# Patient Record
Sex: MALE | Race: WHITE | NOT HISPANIC OR LATINO | Employment: OTHER | URBAN - METROPOLITAN AREA
[De-identification: names, ages, dates, MRNs, and addresses within clinical notes are randomized per-mention and may not be internally consistent; named-entity substitution may affect disease eponyms.]

---

## 2023-04-06 PROBLEM — K63.5 COLON POLYPS: Status: ACTIVE | Noted: 2023-04-06

## 2023-04-14 ENCOUNTER — OFFICE VISIT (OUTPATIENT)
Dept: PRIMARY CARE | Facility: CLINIC | Age: 68
End: 2023-04-14
Payer: MEDICARE

## 2023-04-14 ENCOUNTER — LAB (OUTPATIENT)
Dept: LAB | Facility: LAB | Age: 68
End: 2023-04-14
Payer: MEDICARE

## 2023-04-14 VITALS
HEART RATE: 67 BPM | TEMPERATURE: 97 F | WEIGHT: 245 LBS | SYSTOLIC BLOOD PRESSURE: 128 MMHG | OXYGEN SATURATION: 98 % | BODY MASS INDEX: 38.37 KG/M2 | DIASTOLIC BLOOD PRESSURE: 76 MMHG

## 2023-04-14 DIAGNOSIS — I48.91 ATRIAL FIBRILLATION STATUS POST CARDIOVERSION (MULTI): Primary | ICD-10-CM

## 2023-04-14 DIAGNOSIS — I10 BENIGN ESSENTIAL HTN: ICD-10-CM

## 2023-04-14 DIAGNOSIS — D72.829 LEUKOCYTOSIS, UNSPECIFIED TYPE: ICD-10-CM

## 2023-04-14 LAB
BASOPHILS (10*3/UL) IN BLOOD BY AUTOMATED COUNT: 0.03 X10E9/L (ref 0–0.1)
BASOPHILS/100 LEUKOCYTES IN BLOOD BY AUTOMATED COUNT: 0.2 % (ref 0–2)
EOSINOPHILS (10*3/UL) IN BLOOD BY AUTOMATED COUNT: 0.06 X10E9/L (ref 0–0.7)
EOSINOPHILS/100 LEUKOCYTES IN BLOOD BY AUTOMATED COUNT: 0.5 % (ref 0–6)
ERYTHROCYTE DISTRIBUTION WIDTH (RATIO) BY AUTOMATED COUNT: 16.6 % (ref 11.5–14.5)
ERYTHROCYTE MEAN CORPUSCULAR HEMOGLOBIN CONCENTRATION (G/DL) BY AUTOMATED: 31.9 G/DL (ref 32–36)
ERYTHROCYTE MEAN CORPUSCULAR VOLUME (FL) BY AUTOMATED COUNT: 89 FL (ref 80–100)
ERYTHROCYTES (10*6/UL) IN BLOOD BY AUTOMATED COUNT: 5.49 X10E12/L (ref 4.5–5.9)
HEMATOCRIT (%) IN BLOOD BY AUTOMATED COUNT: 48.9 % (ref 41–52)
HEMOGLOBIN (G/DL) IN BLOOD: 15.6 G/DL (ref 13.5–17.5)
IMMATURE GRANULOCYTES/100 LEUKOCYTES IN BLOOD BY AUTOMATED COUNT: 0.5 % (ref 0–0.9)
LEUKOCYTES (10*3/UL) IN BLOOD BY AUTOMATED COUNT: 12.4 X10E9/L (ref 4.4–11.3)
LYMPHOCYTES (10*3/UL) IN BLOOD BY AUTOMATED COUNT: 1.58 X10E9/L (ref 1.2–4.8)
LYMPHOCYTES/100 LEUKOCYTES IN BLOOD BY AUTOMATED COUNT: 12.7 % (ref 13–44)
MONOCYTES (10*3/UL) IN BLOOD BY AUTOMATED COUNT: 0.87 X10E9/L (ref 0.1–1)
MONOCYTES/100 LEUKOCYTES IN BLOOD BY AUTOMATED COUNT: 7 % (ref 2–10)
NEUTROPHILS (10*3/UL) IN BLOOD BY AUTOMATED COUNT: 9.8 X10E9/L (ref 1.2–7.7)
NEUTROPHILS/100 LEUKOCYTES IN BLOOD BY AUTOMATED COUNT: 79.1 % (ref 40–80)
PLATELETS (10*3/UL) IN BLOOD AUTOMATED COUNT: 214 X10E9/L (ref 150–450)

## 2023-04-14 PROCEDURE — 3074F SYST BP LT 130 MM HG: CPT | Performed by: PHYSICIAN ASSISTANT

## 2023-04-14 PROCEDURE — 3078F DIAST BP <80 MM HG: CPT | Performed by: PHYSICIAN ASSISTANT

## 2023-04-14 PROCEDURE — 99214 OFFICE O/P EST MOD 30 MIN: CPT | Performed by: PHYSICIAN ASSISTANT

## 2023-04-14 PROCEDURE — 85025 COMPLETE CBC W/AUTO DIFF WBC: CPT

## 2023-04-14 PROCEDURE — 1160F RVW MEDS BY RX/DR IN RCRD: CPT | Performed by: PHYSICIAN ASSISTANT

## 2023-04-14 PROCEDURE — 1159F MED LIST DOCD IN RCRD: CPT | Performed by: PHYSICIAN ASSISTANT

## 2023-04-14 PROCEDURE — 1036F TOBACCO NON-USER: CPT | Performed by: PHYSICIAN ASSISTANT

## 2023-04-14 PROCEDURE — 3008F BODY MASS INDEX DOCD: CPT | Performed by: PHYSICIAN ASSISTANT

## 2023-04-14 PROCEDURE — 36415 COLL VENOUS BLD VENIPUNCTURE: CPT

## 2023-04-14 RX ORDER — LOSARTAN POTASSIUM 50 MG/1
50 TABLET ORAL DAILY
COMMUNITY
End: 2024-02-21

## 2023-04-14 RX ORDER — ATORVASTATIN CALCIUM 80 MG/1
80 TABLET, FILM COATED ORAL NIGHTLY
COMMUNITY
End: 2024-03-27

## 2023-04-14 RX ORDER — ASPIRIN 81 MG/1
TABLET ORAL
COMMUNITY
Start: 2018-11-06 | End: 2023-12-19 | Stop reason: WASHOUT

## 2023-04-14 RX ORDER — METOPROLOL SUCCINATE 50 MG/1
50 TABLET, EXTENDED RELEASE ORAL DAILY
COMMUNITY
End: 2024-03-27 | Stop reason: SDUPTHER

## 2023-04-14 RX ORDER — METOPROLOL SUCCINATE 25 MG/1
50 TABLET, EXTENDED RELEASE ORAL DAILY
COMMUNITY
End: 2023-04-14 | Stop reason: WASHOUT

## 2023-04-14 RX ORDER — FLUTICASONE PROPIONATE 50 MCG
SPRAY, SUSPENSION (ML) NASAL
COMMUNITY
Start: 2018-02-27 | End: 2023-12-21 | Stop reason: WASHOUT

## 2023-04-14 RX ORDER — NICOTINE POLACRILEX 4 MG
1 GUM BUCCAL DAILY
COMMUNITY
Start: 2020-11-09 | End: 2023-04-14 | Stop reason: WASHOUT

## 2023-04-14 RX ORDER — ECHINACEA 400 MG
CAPSULE ORAL
COMMUNITY
Start: 2020-11-09

## 2023-04-14 RX ORDER — CLOPIDOGREL BISULFATE 75 MG/1
TABLET ORAL
COMMUNITY
End: 2024-03-27

## 2023-04-14 RX ORDER — CHOLECALCIFEROL (VITAMIN D3) 125 MCG
CAPSULE ORAL
COMMUNITY

## 2023-04-14 ASSESSMENT — ENCOUNTER SYMPTOMS
SHORTNESS OF BREATH: 0
ABDOMINAL PAIN: 0
PALPITATIONS: 0

## 2023-04-14 NOTE — PROGRESS NOTES
Subjective   Patient ID: Duane D Flowers is a 67 y.o. male who presents for Hospital Follow-up (Formerly [facility], [facility] on 4/1-4/4 Re: ablasion, afib).    HPI   Patient presents for hospital follow-up.  Went to ER on 4/1/2023 due to intermittent chest pressure and fatigue that had been going on for about 2 weeks.  Had mild lightheadedness and dizziness with mild SOB and diaphoresis.  Felt some palpitations.  Was found to be in A-fib with RVR.  Had negative stress test.  Had successful cardioversion done and converted back to NSR.     Discharged on Eliquis 5mg bid and increased Metoprolol ER 50mg every day.     BP has been good.  Feeling much better. Doing some jogging exercise without difficulty.     Has follow up with Dr Carter next week.     WBC was elevated while in the hospital. RBC slightly elevated. No evidence of infection.  Recommended repeat CBC outpatient and possible hematology consult.   WBC (x10E9/L)   Date Value   04/14/2023 12.4 (H)   04/04/2023 18.3 (H)   04/03/2023 17.9 (H)   04/02/2023 17.4 (H)   04/01/2023 21.1 (H)   06/07/2022 10.8     RBC (x10E12/L)   Date Value   04/14/2023 5.49   04/04/2023 6.13 (H)   04/03/2023 6.28 (H)   04/02/2023 5.76   04/01/2023 6.44 (H)   06/07/2022 5.51         Review of Systems   Respiratory:  Negative for shortness of breath.    Cardiovascular:  Negative for chest pain and palpitations.   Gastrointestinal:  Negative for abdominal pain.       Objective   /76   Pulse 67   Temp 36.1 °C (97 °F)   Wt 111 kg (245 lb)   SpO2 98%   BMI 38.37 kg/m²     Physical Exam  HENT:      Head: Normocephalic.   Eyes:      General: No scleral icterus.  Cardiovascular:      Rate and Rhythm: Normal rate. Rhythm irregular.      Comments: Rhythm is irregularly irregular with normal rate.   Pulmonary:      Effort: Pulmonary effort is normal.      Breath sounds: Normal breath sounds.   Skin:     General: Skin is warm and dry.   Neurological:      Mental Status: He is alert.   Psychiatric:          Mood and Affect: Affect normal.         Assessment/Plan   Diagnoses and all orders for this visit:  Atrial fibrillation status post cardioversion (CMS/HCC)  Benign essential HTN  Leukocytosis, unspecified type  -     CBC and Auto Differential; Future         Repeat CBC in near future. If still abnormal, consider hematology referral.   Monitor for rapid pulse (or other cardiac symptoms)-- go to ER if develops.   Advised to call his cardiologist to see if they want to move up his appt since he is back in A-fib but normal rate. Follow up with cardiologist.   He is well anti-coagulated.   Follow up as needed, otherwise keep routine follow up as scheduled.

## 2023-04-17 ENCOUNTER — TELEPHONE (OUTPATIENT)
Dept: PRIMARY CARE | Facility: CLINIC | Age: 68
End: 2023-04-17
Payer: COMMERCIAL

## 2023-04-17 NOTE — TELEPHONE ENCOUNTER
----- Message from Scotty Loyd PA-C sent at 4/17/2023  7:31 AM EDT -----  Inform pt that his repeat blood count was much improved. His red blood cell count was back to normal, and his white blood cell count improved a lot and is nearly back to normal.

## 2023-05-05 LAB
ANION GAP IN SER/PLAS: 11 MMOL/L (ref 10–20)
CALCIUM (MG/DL) IN SER/PLAS: 9.2 MG/DL (ref 8.6–10.3)
CARBON DIOXIDE, TOTAL (MMOL/L) IN SER/PLAS: 27 MMOL/L (ref 21–32)
CHLORIDE (MMOL/L) IN SER/PLAS: 108 MMOL/L (ref 98–107)
CREATININE (MG/DL) IN SER/PLAS: 0.86 MG/DL (ref 0.5–1.3)
ERYTHROCYTE DISTRIBUTION WIDTH (RATIO) BY AUTOMATED COUNT: 16.5 % (ref 11.5–14.5)
ERYTHROCYTE MEAN CORPUSCULAR HEMOGLOBIN CONCENTRATION (G/DL) BY AUTOMATED: 31.6 G/DL (ref 32–36)
ERYTHROCYTE MEAN CORPUSCULAR VOLUME (FL) BY AUTOMATED COUNT: 92 FL (ref 80–100)
ERYTHROCYTES (10*6/UL) IN BLOOD BY AUTOMATED COUNT: 5.17 X10E12/L (ref 4.5–5.9)
GFR MALE: >90 ML/MIN/1.73M2
GLUCOSE (MG/DL) IN SER/PLAS: 97 MG/DL (ref 74–99)
HEMATOCRIT (%) IN BLOOD BY AUTOMATED COUNT: 47.8 % (ref 41–52)
HEMOGLOBIN (G/DL) IN BLOOD: 15.1 G/DL (ref 13.5–17.5)
INR IN PPP BY COAGULATION ASSAY: 1.4 (ref 0.9–1.1)
LEUKOCYTES (10*3/UL) IN BLOOD BY AUTOMATED COUNT: 10.8 X10E9/L (ref 4.4–11.3)
PLATELETS (10*3/UL) IN BLOOD AUTOMATED COUNT: 203 X10E9/L (ref 150–450)
POTASSIUM (MMOL/L) IN SER/PLAS: 5.1 MMOL/L (ref 3.5–5.3)
PROTHROMBIN TIME (PT) IN PPP BY COAGULATION ASSAY: 15.8 SEC (ref 9.8–13.4)
SODIUM (MMOL/L) IN SER/PLAS: 141 MMOL/L (ref 136–145)
UREA NITROGEN (MG/DL) IN SER/PLAS: 20 MG/DL (ref 6–23)

## 2023-05-22 ENCOUNTER — HOSPITAL ENCOUNTER (OUTPATIENT)
Dept: DATA CONVERSION | Facility: HOSPITAL | Age: 68
End: 2023-05-22
Attending: INTERNAL MEDICINE | Admitting: INTERNAL MEDICINE
Payer: COMMERCIAL

## 2023-05-22 DIAGNOSIS — Z87.891 PERSONAL HISTORY OF NICOTINE DEPENDENCE: ICD-10-CM

## 2023-05-22 DIAGNOSIS — Z79.01 LONG TERM (CURRENT) USE OF ANTICOAGULANTS: ICD-10-CM

## 2023-05-22 DIAGNOSIS — I48.91 UNSPECIFIED ATRIAL FIBRILLATION (MULTI): ICD-10-CM

## 2023-05-22 DIAGNOSIS — Z79.82 LONG TERM (CURRENT) USE OF ASPIRIN: ICD-10-CM

## 2023-05-22 DIAGNOSIS — I25.10 ATHEROSCLEROTIC HEART DISEASE OF NATIVE CORONARY ARTERY WITHOUT ANGINA PECTORIS: ICD-10-CM

## 2023-05-22 DIAGNOSIS — E78.5 HYPERLIPIDEMIA, UNSPECIFIED: ICD-10-CM

## 2023-05-22 DIAGNOSIS — Z79.02 LONG TERM (CURRENT) USE OF ANTITHROMBOTICS/ANTIPLATELETS: ICD-10-CM

## 2023-05-22 DIAGNOSIS — I10 ESSENTIAL (PRIMARY) HYPERTENSION: ICD-10-CM

## 2023-05-22 DIAGNOSIS — Z95.5 PRESENCE OF CORONARY ANGIOPLASTY IMPLANT AND GRAFT: ICD-10-CM

## 2023-05-22 DIAGNOSIS — E66.9 OBESITY, UNSPECIFIED: ICD-10-CM

## 2023-05-30 LAB
POC ACTIVATED CLOTTING TIME HIGH RANGE: 324 SECONDS (ref 96–152)
POC ACTIVATED CLOTTING TIME HIGH RANGE: 331 SECONDS (ref 96–152)
POC ACTIVATED CLOTTING TIME HIGH RANGE: 332 SECONDS (ref 96–152)
POC ACTIVATED CLOTTING TIME HIGH RANGE: 343 SECONDS (ref 96–152)

## 2023-08-22 PROBLEM — I25.10 CAD (CORONARY ARTERY DISEASE): Status: ACTIVE | Noted: 2023-08-22

## 2023-08-22 PROBLEM — R06.02 SHORTNESS OF BREATH ON EXERTION: Status: ACTIVE | Noted: 2023-08-22

## 2023-08-22 PROBLEM — I10 HYPERTENSION: Status: ACTIVE | Noted: 2023-08-22

## 2023-08-22 PROBLEM — S62.632A CLOSED DISPLACED FRACTURE OF DISTAL PHALANX OF RIGHT MIDDLE FINGER: Status: ACTIVE | Noted: 2023-08-22

## 2023-08-22 PROBLEM — I48.91 ATRIAL FIBRILLATION (MULTI): Status: ACTIVE | Noted: 2023-08-22

## 2023-08-22 PROBLEM — Z98.61 S/P PTCA (PERCUTANEOUS TRANSLUMINAL CORONARY ANGIOPLASTY): Status: ACTIVE | Noted: 2023-08-22

## 2023-08-22 PROBLEM — E78.5 DYSLIPIDEMIA: Status: ACTIVE | Noted: 2023-08-22

## 2023-08-22 PROBLEM — S69.91XA INJURY OF RIGHT MIDDLE FINGER: Status: ACTIVE | Noted: 2023-08-22

## 2023-09-30 NOTE — H&P
History of Present Illness:   History Present Illness:  Reason for surgery: Symptomatic atrial fibrillation  the patient is   HPI:    The patient is 67 years old male with past medical history significant for obesity, hypertension, dyslipidemia, coronary disease status post multiple PCI's, recent  atrial fibrillation is here for elective atrial fibrillation ablation under general esthesia.    The indications, risks, benefits, alternatives, and details of the procedure were explained to the patient who expressed understanding of the risks including but are not limited to: pain, bleeding, and infection for which the risk is less than 1%. More  serious risks, including cardiac perforation and tamponade, vascular trauma, pulmonary vein stenosis, atrio-esophageal fistula, diaphragmatic paralysis, life-threatening arrhythmia, need for permanent pacemaker, stroke, heart attack, and/or death, for  which the overall risk ranges 0.1-1%. After all questions were answered, the patient provided informed and written consent.      Allergies:        Allergies:  ·  No Known Allergies :     Home Medication Review:   Home Medications Reviewed: yes     Impression/Procedure:   ·  Impression and Planned Procedure: Symptomatic atrial fibrillation we will proceed with atrial fibrillation ablation under general anesthesia.       ERAS (Enhanced Recovery After Surgery):  ·  ERAS Patient: no       Physical Exam by System:    Constitutional: Well developed, awake/alert/oriented  x3, no distress, alert and cooperative   Respiratory/Thorax: Normal work of breathing   Cardiovascular: Regular rate and rhythm   Extremities: normal extremities, no cyanosis edema,  contusions or wounds, no clubbing   Neurological: alert and oriented x3   Psychological: Appropriate mood and behavior   Skin: Warm and dry, no lesions, no rashes     Airway/Sedation Assessment:  ·  Assmentment by Anesthesia See anesthesia airway/sedation assessment.     Consent:    COVID-19 Consent:  ·  COVID-19 Risk Consent Surgeon has reviewed key risks related to the risk of sadia COVID-19 and if they contract COVID-19 what the risks are.     Attestation:   Note Completion:  I am a:  Resident/Fellow   Attending Attestation I saw and evaluated the patient.  I personally obtained the key and critical portions of the history and physical exam or was physically present for key and  critical portions performed by the resident/fellow. I reviewed the resident/fellow?s documentation and discussed the patient with the resident/fellow.  I agree with the resident/fellow?s medical decision making as documented in the note.     I personally evaluated the patient on 22-May-2023         Electronic Signatures:  Angel Song (Fellow))  (Signed 22-May-2023 07:39)   Authored: History of Present Illness, Allergies, Home  Medication Review, Impression/Procedure, ERAS, Physical Exam, Consent, Note Completion  Cosme Storey)  (Signed 22-May-2023 21:20)   Authored: Note Completion   Co-Signer: History of Present Illness, Allergies, Home Medication Review, Impression/Procedure, ERAS, Physical Exam, Consent, Note Completion      Last Updated: 22-May-2023 21:20 by Cosme Storey)

## 2023-10-02 ENCOUNTER — OFFICE VISIT (OUTPATIENT)
Dept: CARDIOLOGY | Facility: CLINIC | Age: 68
End: 2023-10-02
Payer: MEDICARE

## 2023-10-02 VITALS
SYSTOLIC BLOOD PRESSURE: 130 MMHG | HEIGHT: 67 IN | DIASTOLIC BLOOD PRESSURE: 72 MMHG | HEART RATE: 65 BPM | BODY MASS INDEX: 38.45 KG/M2 | WEIGHT: 245 LBS

## 2023-10-02 DIAGNOSIS — I48.91 ATRIAL FIBRILLATION BY ELECTROCARDIOGRAM (MULTI): ICD-10-CM

## 2023-10-02 DIAGNOSIS — I25.10 CORONARY ARTERY DISEASE INVOLVING NATIVE CORONARY ARTERY OF NATIVE HEART WITHOUT ANGINA PECTORIS: Primary | ICD-10-CM

## 2023-10-02 PROCEDURE — 1160F RVW MEDS BY RX/DR IN RCRD: CPT | Performed by: INTERNAL MEDICINE

## 2023-10-02 PROCEDURE — 1036F TOBACCO NON-USER: CPT | Performed by: INTERNAL MEDICINE

## 2023-10-02 PROCEDURE — 3078F DIAST BP <80 MM HG: CPT | Performed by: INTERNAL MEDICINE

## 2023-10-02 PROCEDURE — 3075F SYST BP GE 130 - 139MM HG: CPT | Performed by: INTERNAL MEDICINE

## 2023-10-02 PROCEDURE — 1159F MED LIST DOCD IN RCRD: CPT | Performed by: INTERNAL MEDICINE

## 2023-10-02 PROCEDURE — 93000 ELECTROCARDIOGRAM COMPLETE: CPT | Performed by: INTERNAL MEDICINE

## 2023-10-02 PROCEDURE — 99213 OFFICE O/P EST LOW 20 MIN: CPT | Performed by: INTERNAL MEDICINE

## 2023-10-02 PROCEDURE — 3008F BODY MASS INDEX DOCD: CPT | Performed by: INTERNAL MEDICINE

## 2023-10-02 NOTE — PROGRESS NOTES
Referred  for Atrial Fibrillation       Duane D Flowers is a 67 y.o. year old male patient with    1. CAD: s/p PCI/stent to the LAD and first diagonal    2. Hypertension: reviewed and acceptable    3. Atrial fibrillation: Patient reports he started to feel palpitations and intermittent lightheadedness and SOB in the middle of March 2023. Patient was admitted early April with palpitations, CP, SOB, lightheadedness. He was found to be in AF RVR. He was cardioverted and started on Eliquis and metoprolol was increased. Echocardiogram showed LV function of 60-65%. Patient agreed to ablation for symptomatic AF though may not improve PACs.    5/22/2023 successful PVI ablation. No significant scar noted    Patient reports he is feeling well today and continues to exercise daily. He denies SOB or palpitations. He is concerned about having some memory fog with difficulty finding words at times. He feels the memory fog started after he began Eliquis. He denies any bleeding concerns.     ECG today sinus rhythm with rate of 65 bpm    PMHx/PSHx: As above  Tobacco Denies, Alcohol Denies, Caffeine use  Denies, Drug use  Denies  FamHx:          Allergies:  No Known Allergies     Outpatient Medications:  Current Outpatient Medications   Medication Instructions    apixaban (Eliquis) 5 mg tablet TAKE 1 TABLET BY MOUTH TWO TIMES A DAY    apixaban (ELIQUIS) 5 mg, oral, 2 times daily    aspirin 81 mg EC tablet oral    atorvastatin (LIPITOR) 80 mg, oral, Nightly    CHELATED ZINC ORAL 50 mg, oral, Daily    cholecalciferol (Vitamin D-3) 125 MCG (5000 UT) capsule oral    clopidogrel (Plavix) 75 mg tablet TAKE 1 TABLET DAILY (LOADING DOSE OF 4 TABLETS FIRST DAY).    elderberry fruit and flower 460-115 mg capsule oral    fluticasone (Flonase) 50 mcg/actuation nasal spray     losartan (COZAAR) 50 mg, oral, Daily    metoprolol succinate XL (TOPROL-XL) 50 mg, oral, Daily         Last Recorded Vitals:      6/23/2021    11:31 AM 6/23/2021    11:34  "AM 11/29/2021    11:39 AM 12/5/2022    10:18 AM 4/14/2023     1:24 PM 4/19/2023    11:15 AM 7/5/2023     9:22 AM   Vitals   Systolic 175  138 142 128 148 130   Diastolic 95  72 80 76 88 80   Heart Rate 69  76 74 67 66 64   Temp 36.7 °C (98.1 °F)    36.1 °C (97 °F)     Resp 14   18  14    Height (in)  1.701 m (5' 6.97\") 1.702 m (5' 7\") 1.702 m (5' 7\")   1.702 m (5' 7\")   Weight (lb)  252.43 258 256 245  245   BMI  39.57 kg/m2 40.41 kg/m2 40.1 kg/m2 38.37 kg/m2  38.37 kg/m2   BSA (m2)  2.33 m2 2.35 m2 2.34 m2 2.29 m2  2.29 m2   Visit Report     Report      Visit Vitals  Smoking Status Former        Physical Exam:  Physical Exam  Constitutional:       Appearance: Normal appearance.   Eyes:      Pupils: Pupils are equal, round, and reactive to light.   Cardiovascular:      Rate and Rhythm: Normal rate and regular rhythm.      Heart sounds: Normal heart sounds.   Pulmonary:      Effort: Pulmonary effort is normal.      Breath sounds: Normal breath sounds.   Musculoskeletal:         General: Normal range of motion.   Skin:     General: Skin is warm and dry.   Neurological:      Mental Status: He is alert and oriented to person, place, and time.           My Interpretation of Reviewed Study(s):  Echo(April/2023): Echocardiogram showed normal LV function of 60-65%.     Stress Test (April/2023): Normal stress myocardial perfusion imaging in response to pharmacologic stress  Prior ECGs (reviewed and my interpretation): ECG personally reviewed showed sinus rhythm today.    Assessment    1. CAD: s/p PCI/stent to the LAD and first diagonal  Denies chest pain  Continued on clopidegrel    2. Hypertension:  Continues losartan  Acceptable today    3. Atrial fibrillation:   Initial AF noted in April 2023 with successful AF ablation in May 2023. He has had no recurrent AF. We discussed \"brain fog\" with difficulty of finding words and recommended neurology referral. Patient declined at this time and prefers to wait.    PLAN   We will " continue current medications and follow up in 6 months with EP NP        Exclusive of any other services or procedures performed, I, Cosme Buitrago MD , spent 30 minutes in duration for this visit today.  This time consisted of chart review, obtaining history, and/or performing the exam as documented above as well as documenting the clinical information for the encounter in the electronic record, discussing treatment options, plans, and/or goals with patient, family, and/or caregiver, refilling medications, updating the electronic record, ordering medicines, lab work, imaging, referrals, and/or procedures as documented above and communicating with other [facility] professionals. I have discussed the results of laboratory, radiology, and cardiology studies with the patient and their family/caregiver.

## 2023-12-05 ENCOUNTER — APPOINTMENT (OUTPATIENT)
Dept: CARDIOLOGY | Facility: CLINIC | Age: 68
End: 2023-12-05
Payer: MEDICARE

## 2023-12-18 ENCOUNTER — APPOINTMENT (OUTPATIENT)
Dept: CARDIOLOGY | Facility: CLINIC | Age: 68
End: 2023-12-18
Payer: MEDICARE

## 2023-12-19 ENCOUNTER — OFFICE VISIT (OUTPATIENT)
Dept: CARDIOLOGY | Facility: CLINIC | Age: 68
End: 2023-12-19
Payer: MEDICARE

## 2023-12-19 VITALS
WEIGHT: 240 LBS | HEIGHT: 67 IN | SYSTOLIC BLOOD PRESSURE: 120 MMHG | BODY MASS INDEX: 37.67 KG/M2 | DIASTOLIC BLOOD PRESSURE: 70 MMHG | HEART RATE: 61 BPM

## 2023-12-19 DIAGNOSIS — I25.10 CORONARY ARTERY DISEASE INVOLVING NATIVE CORONARY ARTERY OF NATIVE HEART WITHOUT ANGINA PECTORIS: Primary | ICD-10-CM

## 2023-12-19 PROCEDURE — 3008F BODY MASS INDEX DOCD: CPT | Performed by: INTERNAL MEDICINE

## 2023-12-19 PROCEDURE — 1160F RVW MEDS BY RX/DR IN RCRD: CPT | Performed by: INTERNAL MEDICINE

## 2023-12-19 PROCEDURE — 3074F SYST BP LT 130 MM HG: CPT | Performed by: INTERNAL MEDICINE

## 2023-12-19 PROCEDURE — 3078F DIAST BP <80 MM HG: CPT | Performed by: INTERNAL MEDICINE

## 2023-12-19 PROCEDURE — 99213 OFFICE O/P EST LOW 20 MIN: CPT | Performed by: INTERNAL MEDICINE

## 2023-12-19 PROCEDURE — 1036F TOBACCO NON-USER: CPT | Performed by: INTERNAL MEDICINE

## 2023-12-19 PROCEDURE — 1159F MED LIST DOCD IN RCRD: CPT | Performed by: INTERNAL MEDICINE

## 2023-12-19 PROCEDURE — 93000 ELECTROCARDIOGRAM COMPLETE: CPT | Performed by: INTERNAL MEDICINE

## 2023-12-19 RX ORDER — PANTOPRAZOLE SODIUM 40 MG/1
40 TABLET, DELAYED RELEASE ORAL DAILY
COMMUNITY
Start: 2023-05-22 | End: 2023-12-21 | Stop reason: ALTCHOICE

## 2023-12-19 ASSESSMENT — ENCOUNTER SYMPTOMS
OCCASIONAL FEELINGS OF UNSTEADINESS: 0
DEPRESSION: 0
LOSS OF SENSATION IN FEET: 0

## 2023-12-19 NOTE — LETTER
"December 19, 2023     Scotty Loyd PA-C  [address]  [address] [address]    Patient: Duane D Flowers   YOB: 1955   Date of Visit: 12/19/2023       Dear Dr. Scotty Loyd PA-C:    Thank you for referring Duane Flowers to me for evaluation. Below are my notes for this consultation.  If you have questions, please do not hesitate to call me. I look forward to following your patient along with you.       Sincerely,     Ashish Carter MD      CC: No Recipients  ______________________________________________________________________________________    Counseling:  The patient was counseled regarding diagnostic results, instructions for management, risk factor reductions, prognosis, patient and family education, impressions, risks and benefits of treatment options and importance of compliance with treatment.      Chief Complaint:   The patient presents today for annual followup of CAD, HTN, hyperlipidemia and a-fib.      History Of Present Illness:    Duane Flowers is a 68 year old male patient who presents today for annual followup of CAD, dyslipidemia and HTN. His PMH is significant for CAD s/p PCI/stent to the LAD and first diagonal (most recently in 2013), dyslipidemia, umbilical hernia s/p repair summer 2022, and HTN. Over the past year, the patient states that he has done well from a cardiac standpoint. He denies any CP, chest discomfort or SOB. He has been practicing medication which has resulted in overall mental clarity and optimization of BP. EKG today shows that the patient is maintaining NSR. The patient is compliant with his prescribed medications.     Last Recorded Vitals:  Vitals:    12/19/23 1416   BP: 120/70   BP Location: Left arm   Pulse: 61   Weight: 109 kg (240 lb)   Height: 1.702 m (5' 7\")       Past Surgical History:  He has a past surgical history that includes Other surgical history (11/06/2018); Other surgical history (11/06/2018); Other surgical history " (11/06/2018); Other surgical history (11/29/2021); and XR shoulder.      Social History:  He reports that he quit smoking about 35 years ago. His smoking use included cigarettes. He smoked an average of 2 packs per day. He has never used smokeless tobacco. He reports that he does not currently use alcohol. He reports that he does not use drugs.    Family History:  Family History   Problem Relation Name Age of Onset   • Cancer Mother     • Cancer Father     • Diabetes Father     • Coronary artery disease Father     • Coronary artery disease Brother          Allergies:  Patient has no known allergies.    Outpatient Medications:  Current Outpatient Medications   Medication Instructions   • apixaban (Eliquis) 5 mg tablet TAKE 1 TABLET BY MOUTH TWO TIMES A DAY   • apixaban (ELIQUIS) 5 mg, oral, 2 times daily   • aspirin 81 mg EC tablet oral   • atorvastatin (LIPITOR) 80 mg, oral, Nightly   • CHELATED ZINC ORAL 50 mg, oral, Daily   • cholecalciferol (Vitamin D-3) 125 MCG (5000 UT) capsule oral   • clopidogrel (Plavix) 75 mg tablet TAKE 1 TABLET DAILY (LOADING DOSE OF 4 TABLETS FIRST DAY).   • elderberry fruit and flower 460-115 mg capsule oral   • fluticasone (Flonase) 50 mcg/actuation nasal spray    • losartan (COZAAR) 50 mg, oral, Daily   • metoprolol succinate XL (TOPROL-XL) 50 mg, oral, Daily   • pantoprazole (PROTONIX) 40 mg, oral, Daily     Review of Systems   All other systems reviewed and are negative.     Physical Exam:  Constitutional:       Appearance: Healthy appearance. Not in distress.   Neck:      Vascular: No JVR. JVD normal.   Pulmonary:      Effort: Pulmonary effort is normal.      Breath sounds: Normal breath sounds. No wheezing. No rhonchi. No rales.   Chest:      Chest wall: Not tender to palpatation.   Cardiovascular:      PMI at left midclavicular line. Normal rate. Regular rhythm. Normal S1. Normal S2.       Murmurs: There is no murmur.      No gallop.  No click. No rub.   Pulses:     Intact distal  pulses.   Edema:     Peripheral edema absent.   Abdominal:      General: Bowel sounds are normal.      Palpations: Abdomen is soft.      Tenderness: There is no abdominal tenderness.   Musculoskeletal: Normal range of motion.         General: No tenderness. Skin:     General: Skin is warm and dry.   Neurological:      General: No focal deficit present.      Mental Status: Alert and oriented to person, place and time.          Last Labs:  CBC -  Lab Results   Component Value Date    WBC 10.8 05/05/2023    HGB 15.1 05/05/2023    HCT 47.8 05/05/2023    MCV 92 05/05/2023     05/05/2023       CMP -  Lab Results   Component Value Date    CALCIUM 9.2 05/05/2023    PROT 5.5 (L) 04/02/2023    ALBUMIN 3.6 04/02/2023    AST 18 04/02/2023    ALT 55 (H) 04/02/2023    ALKPHOS 61 04/02/2023    BILITOT 1.6 (H) 04/02/2023       LIPID PANEL -   Lab Results   Component Value Date    CHOL 123 06/07/2022    TRIG 77 06/07/2022    HDL 38.7 (A) 06/07/2022    CHHDL 3.2 06/07/2022    LDLF 69 06/07/2022    VLDL 15 06/07/2022       RENAL FUNCTION PANEL -   Lab Results   Component Value Date    GLUCOSE 97 05/05/2023     05/05/2023    K 5.1 05/05/2023     (H) 05/05/2023    CO2 27 05/05/2023    ANIONGAP 11 05/05/2023    BUN 20 05/05/2023    CREATININE 0.86 05/05/2023    GFRMALE >90 05/05/2023    CALCIUM 9.2 05/05/2023    ALBUMIN 3.6 04/02/2023        Last Cardiology Tests:  05/22/2023 - Electrophysiology Procedure  A-Fib RFA.    04/04/2023 - [facility]  1. Left ventricular systolic function is normal.  2. Successful direct current cardioversion for atrial fibrillation resulting in normal sinus rhythm.  3. No left atrial mass.  4. No left atrial thrombus.    04/03/2023 - Stress Test  1. Normal stress myocardial perfusion imaging in response to pharmacologic stress. Mild left ventricular systolic dysfunction on post stress gated imaging.  2. Normal Stress Test. No clinical or electrocardiographic evidence for ischemia at maximal  infusion.    04/03/2023 - TTE  1. Left ventricular systolic function is normal with a 60-65% estimated ejection fraction.  2. Moderately increased left ventricular septal thickness.  3. There is low normal right ventricular systolic function.    12/13/2022 - TTE  1. Left ventricular systolic function is normal with a 60% estimated ejection fraction.  2. There is moderate concentric left ventricular hypertrophy.    06/17/2021 - Stress Test  1. Normal perfusion without evidence of ischemia. Calculated ejection fraction of 63% without segmental wall motion abnormality seen.  2. Abnormal EKG treadmill stress test secondary to EKG changes without complaint of chest pain. The Woo score is 3.     05/16/2013 -  Cardiac Catheterization/Left Ventriculography/Coronary Angiography/Stent Placement/Angioplasty  1. Severe single vessel coronary artery disease.  2. SUccessful stenting and successful angioplasty of the 100% in-stent restenosis of the pre-existing stent in the mid left anterior descending artery.   3. Successful angioplasty and successful stenting of the 80% stenosis in the proximal first diagonal artery.  4. The left ventricular ejection fraction is 55%.  5. Normal left ventricular size and contractility.     Lab review: I have personally reviewed the laboratory result(s).    Assessment/Plan  1) CAD s/p PCI LAD and 1st diagonal in remote past 2013  On Eliquis 5 mg BID, atorvastatin 80 mg daily, Plavix 75 mg daily, losartan 50 mg daily, metoprolol succinate 50 mg daily  Continue beta blocker > metoprolol succinate 25 mg daily  Counselled about diet, exercise  TTE 04/2023 with LVEF 60%, moderate concentric LVH   Doing well - denies CP, chest discomfort or SOB  EKG shows NSR with no acute changes  Check CBC, CMP, Lipid Panel  F/U 1 year      2) Hyperlipidemia  Continue atorvastatin 80 mg daily  Goal LDL less than 70  Lipid panel 06/2022 with LDL of 69  Check Lipid Panel  F/U 1 year      3) HTN  Stable  Continue  losartan 50 mg daily and metoprolol succinate 50 mg daily  F/U 1 year     4) Atrial Fibrillation s/p DCC and subsequent RFA  Hospital admission 04/2023 with new onset a-fib with RVR  TTE with LVEF 60%, moderate concentric LVH   Stress negative for ischemia  S/P [facility] DCC  S/P RFA 05/22/2023  Maintaining NSR by EKG today      5) Exertional SOB  TTE 12/13/2022 with LVEF 60%,  moderate concentric LVH   Resolved      Scribe Attestation  By signing my name below, I, Chelsea Amaro   attest that this documentation has been prepared under the direction and in the presence of Ashish Carter MD.

## 2023-12-19 NOTE — PROGRESS NOTES
"Counseling:  The patient was counseled regarding diagnostic results, instructions for management, risk factor reductions, prognosis, patient and family education, impressions, risks and benefits of treatment options and importance of compliance with treatment.      Chief Complaint:   The patient presents today for annual followup of CAD, HTN, hyperlipidemia and a-fib.      History Of Present Illness:    Duane Flowers is a 68 year old male patient who presents today for annual followup of CAD, dyslipidemia and HTN. His PMH is significant for CAD s/p PCI/stent to the LAD and first diagonal (most recently in 2013), dyslipidemia, umbilical hernia s/p repair summer 2022, and HTN. Over the past year, the patient states that he has done well from a cardiac standpoint. He denies any CP, chest discomfort or SOB. He has been practicing medication which has resulted in overall mental clarity and optimization of BP. EKG today shows that the patient is maintaining NSR. The patient is compliant with his prescribed medications.     Last Recorded Vitals:  Vitals:    12/19/23 1416   BP: 120/70   BP Location: Left arm   Pulse: 61   Weight: 109 kg (240 lb)   Height: 1.702 m (5' 7\")       Past Surgical History:  He has a past surgical history that includes Other surgical history (11/06/2018); Other surgical history (11/06/2018); Other surgical history (11/06/2018); Other surgical history (11/29/2021); and XR shoulder.      Social History:  He reports that he quit smoking about 35 years ago. His smoking use included cigarettes. He smoked an average of 2 packs per day. He has never used smokeless tobacco. He reports that he does not currently use alcohol. He reports that he does not use drugs.    Family History:  Family History   Problem Relation Name Age of Onset    Cancer Mother      Cancer Father      Diabetes Father      Coronary artery disease Father      Coronary artery disease Brother          Allergies:  Patient has no known " allergies.    Outpatient Medications:  Current Outpatient Medications   Medication Instructions    apixaban (Eliquis) 5 mg tablet TAKE 1 TABLET BY MOUTH TWO TIMES A DAY    apixaban (ELIQUIS) 5 mg, oral, 2 times daily    aspirin 81 mg EC tablet oral    atorvastatin (LIPITOR) 80 mg, oral, Nightly    CHELATED ZINC ORAL 50 mg, oral, Daily    cholecalciferol (Vitamin D-3) 125 MCG (5000 UT) capsule oral    clopidogrel (Plavix) 75 mg tablet TAKE 1 TABLET DAILY (LOADING DOSE OF 4 TABLETS FIRST DAY).    elderberry fruit and flower 460-115 mg capsule oral    fluticasone (Flonase) 50 mcg/actuation nasal spray     losartan (COZAAR) 50 mg, oral, Daily    metoprolol succinate XL (TOPROL-XL) 50 mg, oral, Daily    pantoprazole (PROTONIX) 40 mg, oral, Daily     Review of Systems   All other systems reviewed and are negative.     Physical Exam:  Constitutional:       Appearance: Healthy appearance. Not in distress.   Neck:      Vascular: No JVR. JVD normal.   Pulmonary:      Effort: Pulmonary effort is normal.      Breath sounds: Normal breath sounds. No wheezing. No rhonchi. No rales.   Chest:      Chest wall: Not tender to palpatation.   Cardiovascular:      PMI at left midclavicular line. Normal rate. Regular rhythm. Normal S1. Normal S2.       Murmurs: There is no murmur.      No gallop.  No click. No rub.   Pulses:     Intact distal pulses.   Edema:     Peripheral edema absent.   Abdominal:      General: Bowel sounds are normal.      Palpations: Abdomen is soft.      Tenderness: There is no abdominal tenderness.   Musculoskeletal: Normal range of motion.         General: No tenderness. Skin:     General: Skin is warm and dry.   Neurological:      General: No focal deficit present.      Mental Status: Alert and oriented to person, place and time.          Last Labs:  CBC -  Lab Results   Component Value Date    WBC 10.8 05/05/2023    HGB 15.1 05/05/2023    HCT 47.8 05/05/2023    MCV 92 05/05/2023     05/05/2023       CMP  -  Lab Results   Component Value Date    CALCIUM 9.2 05/05/2023    PROT 5.5 (L) 04/02/2023    ALBUMIN 3.6 04/02/2023    AST 18 04/02/2023    ALT 55 (H) 04/02/2023    ALKPHOS 61 04/02/2023    BILITOT 1.6 (H) 04/02/2023       LIPID PANEL -   Lab Results   Component Value Date    CHOL 123 06/07/2022    TRIG 77 06/07/2022    HDL 38.7 (A) 06/07/2022    CHHDL 3.2 06/07/2022    LDLF 69 06/07/2022    VLDL 15 06/07/2022       RENAL FUNCTION PANEL -   Lab Results   Component Value Date    GLUCOSE 97 05/05/2023     05/05/2023    K 5.1 05/05/2023     (H) 05/05/2023    CO2 27 05/05/2023    ANIONGAP 11 05/05/2023    BUN 20 05/05/2023    CREATININE 0.86 05/05/2023    GFRMALE >90 05/05/2023    CALCIUM 9.2 05/05/2023    ALBUMIN 3.6 04/02/2023        Last Cardiology Tests:  05/22/2023 - Electrophysiology Procedure  A-Fib RFA.    04/04/2023 - [facility]  1. Left ventricular systolic function is normal.  2. Successful direct current cardioversion for atrial fibrillation resulting in normal sinus rhythm.  3. No left atrial mass.  4. No left atrial thrombus.    04/03/2023 - Stress Test  1. Normal stress myocardial perfusion imaging in response to pharmacologic stress. Mild left ventricular systolic dysfunction on post stress gated imaging.  2. Normal Stress Test. No clinical or electrocardiographic evidence for ischemia at maximal infusion.    04/03/2023 - TTE  1. Left ventricular systolic function is normal with a 60-65% estimated ejection fraction.  2. Moderately increased left ventricular septal thickness.  3. There is low normal right ventricular systolic function.    12/13/2022 - TTE  1. Left ventricular systolic function is normal with a 60% estimated ejection fraction.  2. There is moderate concentric left ventricular hypertrophy.    06/17/2021 - Stress Test  1. Normal perfusion without evidence of ischemia. Calculated ejection fraction of 63% without segmental wall motion abnormality seen.  2. Abnormal EKG treadmill stress  test secondary to EKG changes without complaint of chest pain. The Woo score is 3.     05/16/2013 -  Cardiac Catheterization/Left Ventriculography/Coronary Angiography/Stent Placement/Angioplasty  1. Severe single vessel coronary artery disease.  2. SUccessful stenting and successful angioplasty of the 100% in-stent restenosis of the pre-existing stent in the mid left anterior descending artery.   3. Successful angioplasty and successful stenting of the 80% stenosis in the proximal first diagonal artery.  4. The left ventricular ejection fraction is 55%.  5. Normal left ventricular size and contractility.     Lab review: I have personally reviewed the laboratory result(s).    Assessment/Plan   1) CAD s/p PCI LAD and 1st diagonal in remote past 2013  On Eliquis 5 mg BID, atorvastatin 80 mg daily, Plavix 75 mg daily, losartan 50 mg daily, metoprolol succinate 50 mg daily  Continue beta blocker > metoprolol succinate 25 mg daily  Counselled about diet, exercise  TTE 04/2023 with LVEF 60%, moderate concentric LVH   Doing well - denies CP, chest discomfort or SOB  EKG shows NSR with no acute changes  Check CBC, CMP, Lipid Panel  F/U 1 year      2) Hyperlipidemia  Continue atorvastatin 80 mg daily  Goal LDL less than 70  Lipid panel 06/2022 with LDL of 69  Check Lipid Panel  F/U 1 year      3) HTN  Stable  Continue losartan 50 mg daily and metoprolol succinate 50 mg daily  F/U 1 year     4) Atrial Fibrillation s/p DCC and subsequent RFA  Hospital admission 04/2023 with new onset a-fib with RVR  TTE with LVEF 60%, moderate concentric LVH   Stress negative for ischemia  S/P [facility] DCC  S/P RFA 05/22/2023  Maintaining NSR by EKG today      5) Exertional SOB  TTE 12/13/2022 with LVEF 60%,  moderate concentric LVH   Resolved      Scribe Attestation  By signing my name below, I, Chelsea Amaro   attest that this documentation has been prepared under the direction and in the presence of Ashish Carter MD.

## 2023-12-19 NOTE — PATIENT INSTRUCTIONS
Continue all current medications as prescribed.   Please have blood work drawn at your earliest convenience. You will be notified of the results once they become available.   Followup with Dr. Carter in 1 year, sooner should any issues or concerns arise before then.     If you have any questions or cardiac concerns, please call our office at [phone].

## 2023-12-21 ENCOUNTER — OFFICE VISIT (OUTPATIENT)
Dept: PRIMARY CARE | Facility: CLINIC | Age: 68
End: 2023-12-21
Payer: MEDICARE

## 2023-12-21 VITALS
OXYGEN SATURATION: 94 % | HEIGHT: 68 IN | SYSTOLIC BLOOD PRESSURE: 128 MMHG | HEART RATE: 73 BPM | TEMPERATURE: 97 F | WEIGHT: 243 LBS | DIASTOLIC BLOOD PRESSURE: 84 MMHG | BODY MASS INDEX: 36.83 KG/M2

## 2023-12-21 DIAGNOSIS — I25.10 CORONARY ARTERY DISEASE INVOLVING NATIVE CORONARY ARTERY OF NATIVE HEART WITHOUT ANGINA PECTORIS: ICD-10-CM

## 2023-12-21 DIAGNOSIS — E66.01 OBESITY, MORBID (MULTI): ICD-10-CM

## 2023-12-21 DIAGNOSIS — Z87.891 HISTORY OF SMOKING: ICD-10-CM

## 2023-12-21 DIAGNOSIS — E78.5 DYSLIPIDEMIA: ICD-10-CM

## 2023-12-21 DIAGNOSIS — Z13.6 ENCOUNTER FOR SCREENING FOR VASCULAR DISEASE: ICD-10-CM

## 2023-12-21 DIAGNOSIS — Z00.00 ROUTINE GENERAL MEDICAL EXAMINATION AT A HEALTH CARE FACILITY: Primary | ICD-10-CM

## 2023-12-21 DIAGNOSIS — I48.91 ATRIAL FIBRILLATION, UNSPECIFIED TYPE (MULTI): ICD-10-CM

## 2023-12-21 DIAGNOSIS — I10 BENIGN ESSENTIAL HYPERTENSION: ICD-10-CM

## 2023-12-21 DIAGNOSIS — Z98.61 S/P PTCA (PERCUTANEOUS TRANSLUMINAL CORONARY ANGIOPLASTY): ICD-10-CM

## 2023-12-21 PROCEDURE — 1036F TOBACCO NON-USER: CPT | Performed by: PHYSICIAN ASSISTANT

## 2023-12-21 PROCEDURE — 1159F MED LIST DOCD IN RCRD: CPT | Performed by: PHYSICIAN ASSISTANT

## 2023-12-21 PROCEDURE — 3074F SYST BP LT 130 MM HG: CPT | Performed by: PHYSICIAN ASSISTANT

## 2023-12-21 PROCEDURE — 1160F RVW MEDS BY RX/DR IN RCRD: CPT | Performed by: PHYSICIAN ASSISTANT

## 2023-12-21 PROCEDURE — G0439 PPPS, SUBSEQ VISIT: HCPCS | Performed by: PHYSICIAN ASSISTANT

## 2023-12-21 PROCEDURE — 1170F FXNL STATUS ASSESSED: CPT | Performed by: PHYSICIAN ASSISTANT

## 2023-12-21 PROCEDURE — 3008F BODY MASS INDEX DOCD: CPT | Performed by: PHYSICIAN ASSISTANT

## 2023-12-21 PROCEDURE — 99213 OFFICE O/P EST LOW 20 MIN: CPT | Performed by: PHYSICIAN ASSISTANT

## 2023-12-21 PROCEDURE — 3079F DIAST BP 80-89 MM HG: CPT | Performed by: PHYSICIAN ASSISTANT

## 2023-12-21 ASSESSMENT — PATIENT HEALTH QUESTIONNAIRE - PHQ9
2. FEELING DOWN, DEPRESSED OR HOPELESS: NOT AT ALL
1. LITTLE INTEREST OR PLEASURE IN DOING THINGS: NOT AT ALL
SUM OF ALL RESPONSES TO PHQ9 QUESTIONS 1 AND 2: 0

## 2023-12-21 ASSESSMENT — ACTIVITIES OF DAILY LIVING (ADL)
GROCERY_SHOPPING: INDEPENDENT
BATHING: INDEPENDENT
DOING_HOUSEWORK: INDEPENDENT
TAKING_MEDICATION: INDEPENDENT
DRESSING: INDEPENDENT
MANAGING_FINANCES: INDEPENDENT

## 2023-12-21 ASSESSMENT — ENCOUNTER SYMPTOMS
PALPITATIONS: 0
ABDOMINAL PAIN: 0
SHORTNESS OF BREATH: 0

## 2023-12-21 NOTE — PROGRESS NOTES
"Subjective   Patient ID: Duane D Flowers is a 68 y.o. male who presents for Medicare Annual Wellness Visit Subsequent.    HPI   Presents for recheck CAD, hyperlipidemia, HTN, and obesity. And annual wellness exam.     CAD: Sees Dr Carter regularly --last there 12/19/23. Seen every 6 months.  Has order for fasting labs from Dr Carter. Doing this next week.     Hyperlipidemia: LDL good on last labs last year. Has order for repeat labs to do next week.   No results found for: \"LDLCALC\"  LDL (mg/dL)   Date Value   06/07/2022 69   10/19/2021 59     Triglycerides (mg/dL)   Date Value   06/07/2022 77   10/19/2021 128     HDL (mg/dL)   Date Value   06/07/2022 38.7 (A)   10/19/2021 35.9 (A)     HTN: controlled.  Taking and tolerating losartan and metoprolol XL.     Afib: new onset 4/2023. S/P RFA 05/22/2023. Still on Eliquis.     Morbid Obesity:  Lost a little wt since October.   Wt Readings from Last 4 Encounters:   12/21/23 110 kg (243 lb)   12/19/23 109 kg (240 lb)   10/02/23 111 kg (245 lb)   07/05/23 111 kg (245 lb)     BMI Readings from Last 4 Encounters:   12/21/23 36.95 kg/m²   12/19/23 37.59 kg/m²   10/02/23 38.37 kg/m²   07/05/23 38.37 kg/m²     Does exercise 5-7x per week. Using elliptical.   Does fair with diet.         Annual Wellness visit.     Reviewed patient's answers to all Medicare screening questionnaire questions regarding falls, depression, general health status, nutrition and exercise, functional ability/level of safety, home safety risks, and ADLs/IADLs.     Healthcare POA and Living Will status: Has healthcare POA/living will. (Advised to get us a copy of this to scan into his chart.)     Reviewed meds and discussed consistent use of meds as prescribed. Is not taking any high risk controlled/opioid medications. Tolerates eliquis without bleeding.     No bowel or bladder incontinence.      Colonoscopy: 6/21/21 -- due for repeat in 2026.     The patient has not felt down, felt depressed, felt hopeless, " "felt socially isolated or had little interest or pleasure in doing things over the past 2 weeks. No trouble with bathing, communicating, using the phone, dressing, eating, preparing meals, grooming, walking, toileting, house work, managing money, medications, shopping or transportation. Reports no falls.  The home doesn't have grab bars in the bathroom. Has handrails on the stairs. The home does not have poor lighting or loose rugs.   Wears hearing aids. No diet restrictions. Eats well balanced diet.  No recent hospitalizations.    Feels health is excellent.      Has no significant chronic pain (pain scale 0-1/10).      Regularly exercises.     No cognitive impairment.     No DM or nephropathy.     Discussed getting yearly flu shots.  Has had 2 pneumonia shots. Got Shingrix series.     Specialists seen:  Dr Carter (cardio)  Dr Kirkland (cardio)  Dr Luna (Derm) yearly      Review of Systems   Respiratory:  Negative for shortness of breath.    Cardiovascular:  Negative for chest pain and palpitations.   Gastrointestinal:  Negative for abdominal pain.     Past Medical History:   Diagnosis Date    Atrial fibrillation (CMS/HCC)     CAD (coronary artery disease)     Hyperlipidemia     Hypertension     Morbid obesity (CMS/HCC)     Rectal fistula     Rectal fistula      Family History   Problem Relation Name Age of Onset    Cancer Mother      Cancer Father      Diabetes Father      Coronary artery disease Father      Coronary artery disease Brother        Social History     Tobacco Use    Smoking status: Former     Packs/day: 2     Types: Cigarettes     Quit date:      Years since quittin.9    Smokeless tobacco: Never   Substance Use Topics    Alcohol use: Not Currently    Drug use: Never          Objective   /84   Pulse 73   Temp 36.1 °C (97 °F)   Ht 1.727 m (5' 8\")   Wt 110 kg (243 lb)   SpO2 94%   BMI 36.95 kg/m²     Physical Exam  Vitals and nursing note reviewed.   Constitutional:       " Appearance: He is obese.   HENT:      Head: Normocephalic.   Eyes:      General: No scleral icterus.  Cardiovascular:      Rate and Rhythm: Normal rate and regular rhythm.   Pulmonary:      Effort: Pulmonary effort is normal.      Breath sounds: Normal breath sounds.   Abdominal:      Palpations: Abdomen is soft. There is no mass.      Tenderness: There is no abdominal tenderness.   Skin:     General: Skin is warm and dry.   Neurological:      Mental Status: He is alert.   Psychiatric:         Mood and Affect: Affect normal.         Assessment/Plan   Diagnoses and all orders for this visit:  Routine general medical examination at a [facility] care facility  Benign essential hypertension  Coronary artery disease involving native coronary artery of native heart without angina pectoris  Dyslipidemia  S/P PTCA (percutaneous transluminal coronary angioplasty)  Atrial fibrillation, unspecified type (CMS/HCC)  Encounter for screening for vascular disease  -     Vascular US abdominal aorta anuerysm AAA screening; Future  History of smoking  -     Vascular US abdominal aorta anuerysm AAA screening; Future  Obesity, morbid (CMS/HCC)       Discussed diet and exercise and encouraged wt loss due to high BMI.   Get labs Dr Carter ordered.   Get AAA Screening ultrasound due to hx of smoking.   Get Tdap at pharmacy.   Continue current meds.   Follow up with specialists as directed.   Follow up in 1 year for recheck/AWE.

## 2023-12-21 NOTE — PROGRESS NOTES
Subjective   Reason for Visit: Duane D Flowers is an 68 y.o. male here for a Medicare Wellness visit.     Past Medical, Surgical, and Family History reviewed and updated in chart.    Reviewed all medications by prescribing practitioner or clinical pharmacist (such as prescriptions, OTCs, herbal therapies and supplements) and documented in the medical record.    HPI  Annual Wellness visit.      Reviewed patient's answers to all Medicare screening questionnaire questions regarding falls, depression, general health status, nutrition and exercise, functional ability/level of safety, home safety risks, and ADLs/IADLs.      Healthcare POA and Living Will status: Has healthcare POA/living will. (Advised to get us a copy of this to scan into his chart.)      Reviewed meds and discussed consistent use of meds as prescribed. Is not taking any high risk controlled/opioid medications. Tolerates eliquis without bleeding.      No bowel or bladder incontinence.       Colonoscopy: 6/21/21 -- due for repeat in 2026.      The patient has not felt down, felt depressed, felt hopeless, felt socially isolated or had little interest or pleasure in doing things over the past 2 weeks. No trouble with bathing, communicating, using the phone, dressing, eating, preparing meals, grooming, walking, toileting, house work, managing money, medications, shopping or transportation. Reports no falls.  The home doesn't have grab bars in the bathroom. Has handrails on the stairs. The home does not have poor lighting or loose rugs.   Wears hearing aids. No diet restrictions. Eats well balanced diet.  No recent hospitalizations.     Feels health is excellent.       Has no significant chronic pain (pain scale 0-1/10).       Regularly exercises.      No cognitive impairment.      No DM or nephropathy.      Discussed getting yearly flu shots.  Has had 2 pneumonia shots. Got Shingrix series.      Specialists seen:  Dr Carter (cardio)  Dr Kirkland (cardio)    "Jeremy (Derm) yearly    Patient Care Team:  Scotty Loyd PA-C as PCP - General     Review of Systems   Respiratory:  Negative for shortness of breath.    Cardiovascular:  Negative for chest pain and palpitations.   Gastrointestinal:  Negative for abdominal pain.       Objective   Vitals:  /84   Pulse 73   Temp 36.1 °C (97 °F)   Ht 1.727 m (5' 8\")   Wt 110 kg (243 lb)   SpO2 94%   BMI 36.95 kg/m²       Physical Exam  Vitals and nursing note reviewed.   Constitutional:       Appearance: He is obese.   HENT:      Head: Normocephalic.   Eyes:      General: No scleral icterus.  Cardiovascular:      Rate and Rhythm: Normal rate and regular rhythm.   Pulmonary:      Effort: Pulmonary effort is normal.      Breath sounds: Normal breath sounds.   Abdominal:      Palpations: Abdomen is soft. There is no mass.      Tenderness: There is no abdominal tenderness.   Skin:     General: Skin is warm and dry.   Neurological:      Mental Status: He is alert.   Psychiatric:         Mood and Affect: Affect normal.         Assessment/Plan   Problem List Items Addressed This Visit       Benign essential hypertension    CAD (coronary artery disease)    Dyslipidemia    S/P PTCA (percutaneous transluminal coronary angioplasty)    Atrial fibrillation (CMS/HCC)    Obesity, morbid (CMS/HCC)     Other Visit Diagnoses       Routine general medical examination at a health care facility    -  Primary    Encounter for screening for vascular disease        Relevant Orders    Vascular US abdominal aorta anuerysm AAA screening    History of smoking        Relevant Orders    Vascular US abdominal aorta anuerysm AAA screening             Discussed wellness and safety issues.   Recommended installing grab bars in bathroom to help reduce chances of falls.  Discussed diet and exercise and encouraged wt loss due to high BMI.   Get labs Dr Carter ordered.   Get AAA Screening ultrasound due to hx of smoking.   Get Tdap at pharmacy.   "

## 2023-12-27 ENCOUNTER — LAB (OUTPATIENT)
Dept: LAB | Facility: LAB | Age: 68
End: 2023-12-27
Payer: MEDICARE

## 2023-12-27 DIAGNOSIS — I25.10 CORONARY ARTERY DISEASE INVOLVING NATIVE CORONARY ARTERY OF NATIVE HEART WITHOUT ANGINA PECTORIS: ICD-10-CM

## 2023-12-27 LAB
ALBUMIN SERPL BCP-MCNC: 4.2 G/DL (ref 3.4–5)
ALP SERPL-CCNC: 107 U/L (ref 33–136)
ALT SERPL W P-5'-P-CCNC: 27 U/L (ref 10–52)
ANION GAP SERPL CALC-SCNC: 11 MMOL/L (ref 10–20)
AST SERPL W P-5'-P-CCNC: 16 U/L (ref 9–39)
BASOPHILS # BLD AUTO: 0.05 X10*3/UL (ref 0–0.1)
BASOPHILS NFR BLD AUTO: 0.5 %
BILIRUB SERPL-MCNC: 1.1 MG/DL (ref 0–1.2)
BUN SERPL-MCNC: 22 MG/DL (ref 6–23)
CALCIUM SERPL-MCNC: 9.3 MG/DL (ref 8.6–10.3)
CHLORIDE SERPL-SCNC: 107 MMOL/L (ref 98–107)
CHOLEST SERPL-MCNC: 137 MG/DL (ref 0–199)
CHOLESTEROL/HDL RATIO: 3.4
CO2 SERPL-SCNC: 26 MMOL/L (ref 21–32)
CREAT SERPL-MCNC: 0.92 MG/DL (ref 0.5–1.3)
EOSINOPHIL # BLD AUTO: 0.25 X10*3/UL (ref 0–0.7)
EOSINOPHIL NFR BLD AUTO: 2.7 %
ERYTHROCYTE [DISTWIDTH] IN BLOOD BY AUTOMATED COUNT: 14.9 % (ref 11.5–14.5)
GFR SERPL CREATININE-BSD FRML MDRD: 90 ML/MIN/1.73M*2
GLUCOSE SERPL-MCNC: 96 MG/DL (ref 74–99)
HCT VFR BLD AUTO: 49.4 % (ref 41–52)
HDLC SERPL-MCNC: 40.7 MG/DL
HGB BLD-MCNC: 15.8 G/DL (ref 13.5–17.5)
IMM GRANULOCYTES # BLD AUTO: 0.03 X10*3/UL (ref 0–0.7)
IMM GRANULOCYTES NFR BLD AUTO: 0.3 % (ref 0–0.9)
LDLC SERPL CALC-MCNC: 77 MG/DL
LYMPHOCYTES # BLD AUTO: 1.66 X10*3/UL (ref 1.2–4.8)
LYMPHOCYTES NFR BLD AUTO: 18.2 %
MCH RBC QN AUTO: 28.2 PG (ref 26–34)
MCHC RBC AUTO-ENTMCNC: 32 G/DL (ref 32–36)
MCV RBC AUTO: 88 FL (ref 80–100)
MONOCYTES # BLD AUTO: 1 X10*3/UL (ref 0.1–1)
MONOCYTES NFR BLD AUTO: 10.9 %
NEUTROPHILS # BLD AUTO: 6.15 X10*3/UL (ref 1.2–7.7)
NEUTROPHILS NFR BLD AUTO: 67.4 %
NON HDL CHOLESTEROL: 96 MG/DL (ref 0–149)
NRBC BLD-RTO: 0 /100 WBCS (ref 0–0)
PLATELET # BLD AUTO: 217 X10*3/UL (ref 150–450)
POTASSIUM SERPL-SCNC: 4.4 MMOL/L (ref 3.5–5.3)
PROT SERPL-MCNC: 6.2 G/DL (ref 6.4–8.2)
RBC # BLD AUTO: 5.61 X10*6/UL (ref 4.5–5.9)
SODIUM SERPL-SCNC: 140 MMOL/L (ref 136–145)
TRIGL SERPL-MCNC: 99 MG/DL (ref 0–149)
VLDL: 20 MG/DL (ref 0–40)
WBC # BLD AUTO: 9.1 X10*3/UL (ref 4.4–11.3)

## 2023-12-27 PROCEDURE — 85025 COMPLETE CBC W/AUTO DIFF WBC: CPT

## 2023-12-27 PROCEDURE — 80053 COMPREHEN METABOLIC PANEL: CPT

## 2023-12-27 PROCEDURE — 80061 LIPID PANEL: CPT

## 2023-12-27 PROCEDURE — 36415 COLL VENOUS BLD VENIPUNCTURE: CPT

## 2023-12-28 ENCOUNTER — APPOINTMENT (OUTPATIENT)
Dept: RADIOLOGY | Facility: HOSPITAL | Age: 68
End: 2023-12-28
Payer: MEDICARE

## 2023-12-28 ENCOUNTER — TELEPHONE (OUTPATIENT)
Dept: CARDIOLOGY | Facility: CLINIC | Age: 68
End: 2023-12-28
Payer: COMMERCIAL

## 2023-12-28 NOTE — TELEPHONE ENCOUNTER
1/3/24  0931  Called patient to discuss AAA screening exam. Patient informed nurse he did have an an abd ultrasound scheduled but he cancelled it; reported that even if an aneurysm is found he won't have any interventions.      Nurse verbalized understanding but did suggest he discuss the screening at his next appt with Dr. Carter.    Patient reports he will discuss at next appt with Dr. Carter.    12/28/23  1626  Called patient; no answer. Left voice message for patient to return call to discuss test he was told about.    ----- Message from Hilda Watson sent at 12/22/2023  9:25 AM EST ----  Regarding: testing  Patient called to speak with you in regards to a vascular abdominal aorta screening test that his primary doctor told him about.  Please call after 3pm today

## 2023-12-28 NOTE — TELEPHONE ENCOUNTER
I called and spoke with patient and went over lab results. He denies questions and was happy to hear.   Pt arrives from her PMD's office speaking coherently and following commands and states she has had on and off dizziness for "years" but yesterday had sudden onset dizziness with "room spinning" and generalized b/l weakness. Pt reports improvement at this time and is noted to have equal strength bilaterally.

## 2024-01-04 ENCOUNTER — TELEPHONE (OUTPATIENT)
Dept: CARDIOLOGY | Facility: CLINIC | Age: 69
End: 2024-01-04
Payer: COMMERCIAL

## 2024-01-04 NOTE — TELEPHONE ENCOUNTER
----- Message from Hilda Watson sent at 12/22/2023  9:25 AM EST -----  Regarding: testing  Patient called to speak with you in regards to a vascular abdominal aorta screening test that his primary doctor told him about.  Please call after 3pm today

## 2024-01-09 NOTE — TELEPHONE ENCOUNTER
Patient saw PCP 12/21/23 who ordered AAA screening ultrasound due to hx of smoking. I called and left a voicemail that we would advise to have this done since his PCP ordered and I don't see testing for this before.

## 2024-02-19 DIAGNOSIS — I10 ESSENTIAL (PRIMARY) HYPERTENSION: ICD-10-CM

## 2024-02-21 RX ORDER — LOSARTAN POTASSIUM 50 MG/1
50 TABLET ORAL DAILY
Qty: 90 TABLET | Refills: 1 | Status: SHIPPED | OUTPATIENT
Start: 2024-02-21

## 2024-03-05 NOTE — PROGRESS NOTES
Electrophysiology Follow up Visit    Duane D Flowers is a 68 y.o. year old male patient with    1. CAD: s/p PCI/stent to the LAD and first diagonal     2. Hypertension: reviewed and acceptable     3. Atrial fibrillation: Patient reports he started to feel palpitations and intermittent lightheadedness and SOB in the middle of March 2023. Patient was admitted early April with palpitations, CP, SOB, lightheadedness. He was found to be in AF RVR. He was cardioverted and started on Eliquis and metoprolol was increased. Echocardiogram showed LV function of 60-65%. Patient agreed to ablation for symptomatic AF though may not improve PACs.     5/22/2023 successful PVI ablation. No significant scar noted     We last saw patient in October 2023 and he was maintaining sinus rhythm at that time. He was concerned about having some memory fog with difficulty finding words at times and recommended neurology evaluation though he declined at that time.      Patient here for follow-up for atrial fibrillation. He is feeling well with no palpitations, lightheadedness, SOB, or syncope. He continues to exercise daily. He continues to take Eliquis with no bleeding concerns.      Medical History  He has a past medical history of Atrial fibrillation (CMS/HCC), CAD (coronary artery disease), Hyperlipidemia, Hypertension, Morbid obesity (CMS/HCC), and Rectal fistula.    Social History  He reports that he quit smoking about 36 years ago. His smoking use included cigarettes. He smoked an average of 2 packs per day. He has never used smokeless tobacco. He reports that he does not currently use alcohol. He reports that he does not use drugs.      FamHx:   Family History   Problem Relation Name Age of Onset    Cancer Mother      Cancer Father      Diabetes Father      Coronary artery disease Father      Coronary artery disease Brother         No Known Allergies     Outpatient Medications:  Current Outpatient Medications   Medication Instructions  "   apixaban (Eliquis) 5 mg tablet TAKE 1 TABLET BY MOUTH TWO TIMES A DAY    atorvastatin (LIPITOR) 80 mg, oral, Nightly    CHELATED ZINC ORAL 50 mg, oral, Daily    cholecalciferol (Vitamin D-3) 125 MCG (5000 UT) capsule oral    clopidogrel (Plavix) 75 mg tablet TAKE 1 TABLET DAILY (LOADING DOSE OF 4 TABLETS FIRST DAY).    elderberry fruit and flower 460-115 mg capsule oral    losartan (COZAAR) 50 mg, oral, Daily    metoprolol succinate XL (TOPROL-XL) 50 mg, oral, Daily         Last Recorded Vitals: .vital      4/14/2023     1:24 PM 4/19/2023    11:15 AM 7/5/2023     9:22 AM 10/2/2023     8:31 AM 12/19/2023     2:16 PM 12/21/2023    10:06 AM 3/8/2024     8:08 AM   Vitals   Systolic 128 148 130 130 120 128 120   Diastolic 76 88 80 72 70 84 78   Heart Rate 67 66 64 65 61 73 63   Temp 36.1 °C (97 °F)     36.1 °C (97 °F)    Resp  14        Height (in)   1.702 m (5' 7\") 1.702 m (5' 7\") 1.702 m (5' 7\") 1.727 m (5' 8\") 1.727 m (5' 8\")   Weight (lb) 245  245 245 240 243 243   BMI 38.37 kg/m2  38.37 kg/m2 38.37 kg/m2 37.59 kg/m2 36.95 kg/m2 36.95 kg/m2   BSA (m2) 2.29 m2  2.29 m2 2.29 m2 2.27 m2 2.3 m2 2.3 m2   Visit Report Report   Report Report Report Report    Visit Vitals  /78   Pulse 63   Ht 1.727 m (5' 8\")   Wt 110 kg (243 lb)   BMI 36.95 kg/m²   Smoking Status Former   BSA 2.3 m²        Physical Exam  Constitutional:       Appearance: Normal appearance.   Eyes:      Pupils: Pupils are equal, round, and reactive to light.   Cardiovascular:      Rate and Rhythm: Normal rate and regular rhythm.      Heart sounds: Normal heart sounds.   Pulmonary:      Effort: Pulmonary effort is normal.      Breath sounds: Normal breath sounds.   Musculoskeletal:         General: Normal range of motion.   Skin:     General: Skin is warm and dry.   Neurological:      Mental Status: He is alert and oriented to person, place, and time.      Cardiac Testing Reviewed Study(s):  Echo(April/2023):   CONCLUSIONS:  1. Left ventricular " systolic function is normal.  2. Successful direct current cardioversion for atrial fibrillation resulting in normal sinus rhythm.  3. No left atrial mass.  4. No left atrial thrombus.  Stress Test (April/2023):   IMPRESSION:  Normal stress myocardial perfusion imaging in response to  pharmacologic stress. Mild left ventricular systolic dysfunction on  post stress gated imaging.  ECGs (reviewed and my interpretation):   3/8/2024 sinus rhythm with rate of 63 bpm    Lab Results   Component Value Date    WBC 9.1 12/27/2023    HGB 15.8 12/27/2023    HCT 49.4 12/27/2023     12/27/2023    ALT 27 12/27/2023    AST 16 12/27/2023     12/27/2023    K 4.4 12/27/2023     12/27/2023    CREATININE 0.92 12/27/2023    BUN 22 12/27/2023    CO2 26 12/27/2023    TSH 1.27 04/02/2023    INR 1.4 (H) 05/05/2023       Assessment  Atrial fibrillation: ECG today shows sinus rhythm. Patient is maintaining sinus rhythm since his ablation last year, on no AAD. We reviewed lifestyle to help reduce AF recurrence. He remains very active and BP is well controlled. Will continue current medications including OAC indefinitely due to elevated CHADVasc score.     PLAN  Continue current medications  Follow up scheduled      Exclusive of any other services or procedures performed, Mariana BRANTLEY APRN-CNP , spent 30 minutes in duration for this visit today.  This time consisted of chart review, obtaining history, and/or performing the exam as documented above as well as documenting the clinical information for the encounter in the electronic record, discussing treatment options, plans, and/or goals with patient, family, and/or caregiver, refilling medications, updating the electronic record, ordering medicines, lab work, imaging, referrals, and/or procedures as documented above and communicating with other [facility] professionals. I have discussed the results of laboratory, radiology, and cardiology studies with the patient and  their family/caregiver.

## 2024-03-08 ENCOUNTER — OFFICE VISIT (OUTPATIENT)
Dept: CARDIOLOGY | Facility: CLINIC | Age: 69
End: 2024-03-08
Payer: COMMERCIAL

## 2024-03-08 VITALS
HEIGHT: 68 IN | WEIGHT: 243 LBS | DIASTOLIC BLOOD PRESSURE: 78 MMHG | SYSTOLIC BLOOD PRESSURE: 120 MMHG | HEART RATE: 63 BPM | BODY MASS INDEX: 36.83 KG/M2

## 2024-03-08 DIAGNOSIS — I48.0 PAROXYSMAL ATRIAL FIBRILLATION (MULTI): Primary | ICD-10-CM

## 2024-03-08 PROCEDURE — 3074F SYST BP LT 130 MM HG: CPT | Performed by: NURSE PRACTITIONER

## 2024-03-08 PROCEDURE — 3008F BODY MASS INDEX DOCD: CPT | Performed by: NURSE PRACTITIONER

## 2024-03-08 PROCEDURE — 99214 OFFICE O/P EST MOD 30 MIN: CPT | Performed by: NURSE PRACTITIONER

## 2024-03-08 PROCEDURE — 3078F DIAST BP <80 MM HG: CPT | Performed by: NURSE PRACTITIONER

## 2024-03-08 PROCEDURE — 1036F TOBACCO NON-USER: CPT | Performed by: NURSE PRACTITIONER

## 2024-03-08 PROCEDURE — 1159F MED LIST DOCD IN RCRD: CPT | Performed by: NURSE PRACTITIONER

## 2024-03-24 DIAGNOSIS — I48.91 ATRIAL FIBRILLATION, UNSPECIFIED TYPE (MULTI): Primary | ICD-10-CM

## 2024-03-24 DIAGNOSIS — I25.10 ATHEROSCLEROTIC HEART DISEASE OF NATIVE CORONARY ARTERY WITHOUT ANGINA PECTORIS: ICD-10-CM

## 2024-03-24 DIAGNOSIS — E78.5 HYPERLIPIDEMIA, UNSPECIFIED: ICD-10-CM

## 2024-03-27 RX ORDER — CLOPIDOGREL BISULFATE 75 MG/1
75 TABLET ORAL DAILY
Qty: 90 TABLET | Refills: 2 | Status: SHIPPED | OUTPATIENT
Start: 2024-03-27 | End: 2024-12-22

## 2024-03-27 RX ORDER — METOPROLOL SUCCINATE 50 MG/1
50 TABLET, EXTENDED RELEASE ORAL DAILY
Qty: 90 TABLET | Refills: 2 | Status: SHIPPED | OUTPATIENT
Start: 2024-03-27 | End: 2024-12-22

## 2024-03-27 RX ORDER — ATORVASTATIN CALCIUM 80 MG/1
80 TABLET, FILM COATED ORAL NIGHTLY
Qty: 90 TABLET | Refills: 2 | Status: SHIPPED | OUTPATIENT
Start: 2024-03-27 | End: 2024-12-22

## 2024-03-27 NOTE — TELEPHONE ENCOUNTER
Received electronic refill request for Atorvastatin 80 mg and Plavix 75 mg    Last Appointment: 12/19/23 with Dr Carter   Next Appointment: 12/20/24 with Dr Carter  Last Labs: CBC 12/27/23 WNL    CMP/Lipid 12/27/23  Last Refilled: 2/2023 90 days 3 refills  11/2021 saw Angelita Urias    Will be due for refill of Eliquis and Metoprolol as well.      Age: 68  Weight: 110 kg  3/8/23  Creatinine: 0.92 12/27/23

## 2024-07-07 DIAGNOSIS — I10 ESSENTIAL (PRIMARY) HYPERTENSION: ICD-10-CM

## 2024-07-15 RX ORDER — LOSARTAN POTASSIUM 50 MG/1
50 TABLET ORAL DAILY
Qty: 90 TABLET | Refills: 1 | Status: SHIPPED | OUTPATIENT
Start: 2024-07-15

## 2024-09-04 PROBLEM — K42.9 UMBILICAL HERNIA: Status: ACTIVE | Noted: 2024-09-04

## 2024-09-04 PROBLEM — R53.83 FATIGUE: Status: ACTIVE | Noted: 2024-09-04

## 2024-09-04 PROBLEM — R06.09 DYSPNEA ON EXERTION: Status: ACTIVE | Noted: 2023-08-22

## 2024-09-04 PROBLEM — E66.9 OBESITY: Status: ACTIVE | Noted: 2023-04-04

## 2024-09-04 PROBLEM — Z86.16 PERSONAL HISTORY OF COVID-19: Status: ACTIVE | Noted: 2023-04-04

## 2024-09-04 PROBLEM — S69.90XA FINGER INJURY: Status: ACTIVE | Noted: 2023-08-22

## 2024-09-04 PROBLEM — S62.638A CLOSED FRACTURE OF DISTAL PHALANX OF MIDDLE FINGER: Status: ACTIVE | Noted: 2023-08-22

## 2024-09-04 PROBLEM — K60.4 RECTAL FISTULA: Status: ACTIVE | Noted: 2024-09-04

## 2024-09-04 PROBLEM — I48.91 ATRIAL FIBRILLATION WITH RAPID VENTRICULAR RESPONSE (MULTI): Status: ACTIVE | Noted: 2023-04-01

## 2024-09-04 PROBLEM — K60.40 RECTAL FISTULA: Status: ACTIVE | Noted: 2024-09-04

## 2024-09-04 PROBLEM — F19.21 HISTORY OF SUBSTANCE DEPENDENCE (MULTI): Status: ACTIVE | Noted: 2023-04-04

## 2024-09-04 PROBLEM — Z98.61 STATUS POST PERCUTANEOUS TRANSLUMINAL CORONARY ANGIOPLASTY: Status: ACTIVE | Noted: 2023-04-04

## 2024-09-04 PROBLEM — R10.9 ABDOMINAL PAIN: Status: ACTIVE | Noted: 2024-09-04

## 2024-09-04 PROBLEM — D72.829 LEUKOCYTOSIS: Status: ACTIVE | Noted: 2023-04-14

## 2024-09-04 PROBLEM — Z20.822 CONTACT WITH AND (SUSPECTED) EXPOSURE TO COVID-19: Status: ACTIVE | Noted: 2023-04-04

## 2024-09-04 PROBLEM — R07.9 CHEST PAIN: Status: ACTIVE | Noted: 2023-04-03

## 2024-09-10 NOTE — PROGRESS NOTES
Electrophysiology Follow up Visit    History of Present Illness:  Duane D Flowers is a 68 y.o. year old male patient with    1. CAD: s/p PCI/stent to the LAD and first diagonal     2. Hypertension: reviewed and acceptable     3. Atrial fibrillation: Patient reports he started to feel palpitations and intermittent lightheadedness and SOB in the middle of March 2023. Patient was admitted early April with palpitations, CP, SOB, lightheadedness. He was found to be in AF RVR. He was cardioverted and started on Eliquis and metoprolol was increased. Echocardiogram showed LV function of 60-65%. Patient agreed to ablation for symptomatic AF though may not improve PACs.     5/22/2023 successful PVI ablation. No significant scar noted     We saw patient in October 2023 and he was maintaining sinus rhythm at that time. He was concerned about having some memory fog with difficulty finding words at times and recommended neurology evaluation though he declined at that time.    Patient here for follow up for atrial fibrillation.  He is feeling well with no palpitations, lightheadedness, or syncope. He is compliant with daily medications and denies any bleeding concerns on OAC.        Medical History:  He has a past medical history of Atrial fibrillation (Multi), CAD (coronary artery disease), Hyperlipidemia, Hypertension, Morbid obesity (Multi), and Rectal fistula.    Surgical History:  He has a past surgical history that includes Other surgical history (11/06/2018); Other surgical history (11/06/2018); Other surgical history (11/06/2018); Other surgical history (11/29/2021); and XR shoulder.     Social History:  He reports that he quit smoking about 36 years ago. His smoking use included cigarettes. He has never used smokeless tobacco. He reports that he does not currently use alcohol. He reports that he does not use drugs.         Family History   Problem Relation Name Age of Onset    Cancer Mother      Cancer Father       Diabetes Father      Coronary artery disease Father      Coronary artery disease Brother          ROS:  A 14 point review of systems was done and is negative other than as stated in HPI    Physical Exam  Constitutional:       Appearance: Normal appearance.   Eyes:      Pupils: Pupils are equal, round, and reactive to light.   Cardiovascular:      Rate and Rhythm: Normal rate and regular rhythm.      Heart sounds: Normal heart sounds.   Pulmonary:      Effort: Pulmonary effort is normal.      Breath sounds: Normal breath sounds.   Musculoskeletal:         General: Normal range of motion.   Skin:     General: Skin is warm and dry.   Neurological:      Mental Status: He is alert and oriented to person, place, and time.       Allergies:  Patient has no known allergies.    Outpatient Medications:  Current Outpatient Medications   Medication Instructions    apixaban (Eliquis) 5 mg tablet TAKE 1 TABLET BY MOUTH TWO TIMES A DAY    atorvastatin (LIPITOR) 80 mg, oral, Nightly    CHELATED ZINC ORAL 50 mg, oral, Daily    cholecalciferol (Vitamin D-3) 125 MCG (5000 UT) capsule oral    clopidogrel (PLAVIX) 75 mg, oral, Daily    elderberry fruit and flower 460-115 mg capsule oral    losartan (COZAAR) 50 mg, oral, Daily    metoprolol succinate XL (TOPROL-XL) 50 mg, oral, Daily         Last Labs:  CBC  Lab Results   Component Value Date    WBC 9.1 12/27/2023    HGB 15.8 12/27/2023    HCT 49.4 12/27/2023    MCV 88 12/27/2023     12/27/2023        Renal Function Panel  Lab Results   Component Value Date    GLUCOSE 96 12/27/2023     12/27/2023    K 4.4 12/27/2023     12/27/2023    CO2 26 12/27/2023    ANIONGAP 11 12/27/2023    BUN 22 12/27/2023    CREATININE 0.92 12/27/2023    GFRMALE >90 05/05/2023    CALCIUM 9.3 12/27/2023        CMP  Lab Results   Component Value Date    CALCIUM 9.3 12/27/2023    PROT 6.2 (L) 12/27/2023    ALBUMIN 4.2 12/27/2023    AST 16 12/27/2023    ALT 27 12/27/2023    ALKPHOS 107 12/27/2023  "   BILITOT 1.1 12/27/2023        Mg   Lab Results   Component Value Date    MG 2.35 04/03/2023        Thyroid  Lab Results   Component Value Date    TSH 1.27 04/02/2023        Visit Vitals  /82   Pulse 60   Ht 1.727 m (5' 8\")   Wt 110 kg (243 lb)   BMI 36.95 kg/m²   Smoking Status Former   BSA 2.3 m²           Cardiac Testing Reviewed Study(s):  Echo(April/2023):   CONCLUSIONS:  1. Left ventricular systolic function is normal.  2. Successful direct current cardioversion for atrial fibrillation resulting in normal sinus rhythm.  3. No left atrial mass.  4. No left atrial thrombus.  Stress Test (April/2023):   IMPRESSION:  Normal stress myocardial perfusion imaging in response to  pharmacologic stress. Mild left ventricular systolic dysfunction on  post stress gated imaging.  ECGs (reviewed and my interpretation):   3/8/2024 sinus rhythm with rate of 63 bpm  9/13/2024 sinus rhythm with rate of 60 bpm     Assessment  Atrial fibrillation: Patient with no recurrence of AF since his RFA PVI in May 2023. ECG today personally reviewed which shows sinus rhythm. Labs reviewed. We reviewed lifestyle modifications to reduce risk of recurrence of atrial fibrillation. We also discussed continued anticoagulation indefinitely due to elevated [id number] score. We will continue metoprolol and have patient follow with general cardiology. Patient can be referred back if further concern arises. Patient agreeable to plan.      Plan  Continue metoprolol  Continue to follow general cardiology      Exclusive of any other services or procedures performed, Mariana BRANTLEY APRN-CNP , spent 30 minutes in duration for this visit today.  This time consisted of chart review, obtaining history, and/or performing the exam as documented above as well as documenting the clinical information for the encounter in the electronic record, discussing treatment options, plans, and/or goals with patient, family, and/or caregiver, refilling " medications, updating the electronic record, ordering medicines, lab work, imaging, referrals, and/or procedures as documented above and communicating with other [facility] professionals. I have discussed the results of laboratory, radiology, and cardiology studies with the patient and their family/caregiver.

## 2024-09-13 ENCOUNTER — APPOINTMENT (OUTPATIENT)
Dept: CARDIOLOGY | Facility: CLINIC | Age: 69
End: 2024-09-13
Payer: COMMERCIAL

## 2024-09-13 VITALS
DIASTOLIC BLOOD PRESSURE: 82 MMHG | BODY MASS INDEX: 36.83 KG/M2 | HEART RATE: 60 BPM | HEIGHT: 68 IN | WEIGHT: 243 LBS | SYSTOLIC BLOOD PRESSURE: 130 MMHG

## 2024-09-13 DIAGNOSIS — I48.91 ATRIAL FIBRILLATION WITH RAPID VENTRICULAR RESPONSE (MULTI): Primary | ICD-10-CM

## 2024-09-13 DIAGNOSIS — Z79.01 CURRENT USE OF LONG TERM ANTICOAGULATION: ICD-10-CM

## 2024-09-13 PROCEDURE — 3079F DIAST BP 80-89 MM HG: CPT | Performed by: NURSE PRACTITIONER

## 2024-09-13 PROCEDURE — 1036F TOBACCO NON-USER: CPT | Performed by: NURSE PRACTITIONER

## 2024-09-13 PROCEDURE — 3075F SYST BP GE 130 - 139MM HG: CPT | Performed by: NURSE PRACTITIONER

## 2024-09-13 PROCEDURE — 1160F RVW MEDS BY RX/DR IN RCRD: CPT | Performed by: NURSE PRACTITIONER

## 2024-09-13 PROCEDURE — 1159F MED LIST DOCD IN RCRD: CPT | Performed by: NURSE PRACTITIONER

## 2024-09-13 PROCEDURE — 3008F BODY MASS INDEX DOCD: CPT | Performed by: NURSE PRACTITIONER

## 2024-09-13 PROCEDURE — 99213 OFFICE O/P EST LOW 20 MIN: CPT | Performed by: NURSE PRACTITIONER

## 2024-11-15 ENCOUNTER — TELEPHONE (OUTPATIENT)
Dept: CARDIOLOGY | Facility: HOSPITAL | Age: 69
End: 2024-11-15
Payer: COMMERCIAL

## 2024-11-15 NOTE — TELEPHONE ENCOUNTER
Called patient and got voicemail, left voicemail and stated that we needed to move there appt from 12/17. Left new day and time for patient.    Sending also in the mail for the patient.

## 2024-12-03 ENCOUNTER — APPOINTMENT (OUTPATIENT)
Dept: CARDIOLOGY | Facility: HOSPITAL | Age: 69
End: 2024-12-03
Payer: COMMERCIAL

## 2024-12-17 ENCOUNTER — APPOINTMENT (OUTPATIENT)
Dept: CARDIOLOGY | Facility: HOSPITAL | Age: 69
End: 2024-12-17
Payer: COMMERCIAL

## 2024-12-20 ENCOUNTER — APPOINTMENT (OUTPATIENT)
Dept: CARDIOLOGY | Facility: CLINIC | Age: 69
End: 2024-12-20
Payer: COMMERCIAL

## 2024-12-23 ENCOUNTER — APPOINTMENT (OUTPATIENT)
Dept: PRIMARY CARE | Facility: CLINIC | Age: 69
End: 2024-12-23
Payer: COMMERCIAL

## 2024-12-23 VITALS
HEART RATE: 51 BPM | HEIGHT: 68 IN | TEMPERATURE: 96.5 F | WEIGHT: 232.8 LBS | BODY MASS INDEX: 35.28 KG/M2 | SYSTOLIC BLOOD PRESSURE: 132 MMHG | DIASTOLIC BLOOD PRESSURE: 78 MMHG | OXYGEN SATURATION: 99 %

## 2024-12-23 DIAGNOSIS — I48.91 ATRIAL FIBRILLATION, UNSPECIFIED TYPE (MULTI): ICD-10-CM

## 2024-12-23 DIAGNOSIS — I25.10 CORONARY ARTERY DISEASE INVOLVING NATIVE CORONARY ARTERY OF NATIVE HEART WITHOUT ANGINA PECTORIS: ICD-10-CM

## 2024-12-23 DIAGNOSIS — Z00.00 ROUTINE GENERAL MEDICAL EXAMINATION AT HEALTH CARE FACILITY: ICD-10-CM

## 2024-12-23 DIAGNOSIS — E66.01 OBESITY, MORBID (MULTI): ICD-10-CM

## 2024-12-23 DIAGNOSIS — E78.5 DYSLIPIDEMIA: ICD-10-CM

## 2024-12-23 DIAGNOSIS — Z98.61 S/P PTCA (PERCUTANEOUS TRANSLUMINAL CORONARY ANGIOPLASTY): ICD-10-CM

## 2024-12-23 DIAGNOSIS — I10 BENIGN ESSENTIAL HYPERTENSION: Primary | ICD-10-CM

## 2024-12-23 PROBLEM — F19.21 HISTORY OF SUBSTANCE DEPENDENCE (MULTI): Status: RESOLVED | Noted: 2023-04-04 | Resolved: 2024-12-23

## 2024-12-23 PROCEDURE — 3075F SYST BP GE 130 - 139MM HG: CPT | Performed by: PHYSICIAN ASSISTANT

## 2024-12-23 PROCEDURE — G0439 PPPS, SUBSEQ VISIT: HCPCS | Performed by: PHYSICIAN ASSISTANT

## 2024-12-23 PROCEDURE — 3078F DIAST BP <80 MM HG: CPT | Performed by: PHYSICIAN ASSISTANT

## 2024-12-23 PROCEDURE — 3008F BODY MASS INDEX DOCD: CPT | Performed by: PHYSICIAN ASSISTANT

## 2024-12-23 PROCEDURE — 1160F RVW MEDS BY RX/DR IN RCRD: CPT | Performed by: PHYSICIAN ASSISTANT

## 2024-12-23 PROCEDURE — 1158F ADVNC CARE PLAN TLK DOCD: CPT | Performed by: PHYSICIAN ASSISTANT

## 2024-12-23 PROCEDURE — 99214 OFFICE O/P EST MOD 30 MIN: CPT | Performed by: PHYSICIAN ASSISTANT

## 2024-12-23 PROCEDURE — 1159F MED LIST DOCD IN RCRD: CPT | Performed by: PHYSICIAN ASSISTANT

## 2024-12-23 PROCEDURE — 1123F ACP DISCUSS/DSCN MKR DOCD: CPT | Performed by: PHYSICIAN ASSISTANT

## 2024-12-23 PROCEDURE — 1170F FXNL STATUS ASSESSED: CPT | Performed by: PHYSICIAN ASSISTANT

## 2024-12-23 ASSESSMENT — PATIENT HEALTH QUESTIONNAIRE - PHQ9
SUM OF ALL RESPONSES TO PHQ9 QUESTIONS 1 AND 2: 0
1. LITTLE INTEREST OR PLEASURE IN DOING THINGS: NOT AT ALL
2. FEELING DOWN, DEPRESSED OR HOPELESS: NOT AT ALL

## 2024-12-23 ASSESSMENT — ACTIVITIES OF DAILY LIVING (ADL)
BATHING: INDEPENDENT
TAKING_MEDICATION: INDEPENDENT
MANAGING_FINANCES: INDEPENDENT
GROCERY_SHOPPING: INDEPENDENT
DOING_HOUSEWORK: INDEPENDENT
DRESSING: INDEPENDENT

## 2024-12-23 ASSESSMENT — ENCOUNTER SYMPTOMS
ABDOMINAL PAIN: 0
PALPITATIONS: 0
SHORTNESS OF BREATH: 0

## 2024-12-23 NOTE — PROGRESS NOTES
Subjective   Patient ID: Duane D Flowers is a 69 y.o. male who presents for Medicare Annual Wellness Visit Subsequent (AWE).    HPI   Presents for recheck CAD, hyperlipidemia, HTN, and obesity. And annual wellness exam.     CAD: Sees Dr Carter (or his CNP) regularly --last there 3/8/24. Seen about every 6 months. Has follow up with Dr Carter 1/13/25.     Hyperlipidemia: LDL good on last labs last year.   LDL Calculated (mg/dL)   Date Value   12/27/2023 77     LDL (mg/dL)   Date Value   06/07/2022 69   10/19/2021 59     Triglycerides (mg/dL)   Date Value   12/27/2023 99   06/07/2022 77   10/19/2021 128     HDL-Cholesterol (mg/dL)   Date Value   12/27/2023 40.7     HDL (mg/dL)   Date Value   06/07/2022 38.7 (A)   10/19/2021 35.9 (A)     HTN: controlled.  Taking and tolerating losartan and metoprolol XL.     Afib: onset 4/2023. S/P RFA 05/22/2023. Still on Eliquis.     Morbid Obesity:  Lost some wt since last visit.    Wt Readings from Last 4 Encounters:   12/23/24 106 kg (232 lb 12.8 oz)   09/13/24 110 kg (243 lb)   03/08/24 110 kg (243 lb)   12/21/23 110 kg (243 lb)     BMI Readings from Last 4 Encounters:   12/23/24 35.40 kg/m²   09/13/24 36.95 kg/m²   03/08/24 36.95 kg/m²   12/21/23 36.95 kg/m²     Does exercise 5-7x per week. Using elliptical.   Does fair with diet.         Review of Systems   Respiratory:  Negative for shortness of breath.    Cardiovascular:  Negative for chest pain and palpitations.   Gastrointestinal:  Negative for abdominal pain.     Past Medical History:   Diagnosis Date    Atrial fibrillation (Multi)     CAD (coronary artery disease)     Hyperlipidemia     Hypertension     Morbid obesity (Multi)     Rectal fistula     Rectal fistula      Family History   Problem Relation Name Age of Onset    Cancer Mother      Cancer Father      Diabetes Father      Coronary artery disease Father      Coronary artery disease Brother        Social History     Tobacco Use    Smoking status: Former     Current  "packs/day: 0.00     Types: Cigarettes     Quit date:      Years since quittin.0    Smokeless tobacco: Never   Substance Use Topics    Alcohol use: Not Currently    Drug use: Never          Objective   /78   Pulse 51   Temp 35.8 °C (96.5 °F)   Ht 1.727 m (5' 8\")   Wt 106 kg (232 lb 12.8 oz)   SpO2 99%   BMI 35.40 kg/m²     Physical Exam  Vitals and nursing note reviewed.   Constitutional:       Appearance: He is obese.   HENT:      Head: Normocephalic.   Eyes:      General: No scleral icterus.  Cardiovascular:      Rate and Rhythm: Normal rate and regular rhythm.   Pulmonary:      Effort: Pulmonary effort is normal.      Breath sounds: Normal breath sounds.   Abdominal:      Palpations: Abdomen is soft. There is no mass.      Tenderness: There is no abdominal tenderness.   Skin:     General: Skin is warm and dry.   Neurological:      Mental Status: He is alert.   Psychiatric:         Mood and Affect: Affect normal.         Assessment/Plan   Diagnoses and all orders for this visit:  Benign essential hypertension  Coronary artery disease involving native coronary artery of native heart without angina pectoris  Dyslipidemia  S/P PTCA (percutaneous transluminal coronary angioplasty)  Atrial fibrillation, unspecified type (Multi)  Obesity, morbid (Multi)  Routine general medical examination at health care facility       Discussed diet and exercise and encouraged wt loss due to high BMI.   Get AAA Screening ultrasound due to hx of smoking.  Patient refuses to do -- feels like he is fine and wouldn't do any procedure to fix it even if he had one.   Get Tdap and RSV vaccines at pharmacy.   Continue current meds.   Follow up with specialists as directed.   Follow up in 1 year for recheck HTN, CAD, hyperlipidemia and AWE.   "

## 2024-12-23 NOTE — PROGRESS NOTES
Subjective   Reason for Visit: Duane D Flowers is an 69 y.o. male here for a Medicare Wellness visit.     Past Medical, Surgical, and Family History reviewed and updated in chart.         HPI    Annual Wellness visit.      Reviewed patient's answers to all Medicare screening questionnaire questions regarding falls, depression, general health status, nutrition and exercise, functional ability/level of safety, home safety risks, and ADLs/IADLs.      Healthcare POA and Living Will status: Has healthcare POA/living will. (Advised to get us a copy of this to scan into his chart.)      Reviewed meds and discussed consistent use of meds as prescribed. Is not taking any high risk controlled/opioid medications. Tolerates eliquis without bleeding.      No bowel or bladder incontinence.       Colonoscopy: 6/21/21 -- due for repeat in 2026.      The patient has not felt down, felt depressed, felt hopeless, felt socially isolated or had little interest or pleasure in doing things over the past 2 weeks. No trouble with bathing, communicating, using the phone, dressing, eating, preparing meals, grooming, walking, toileting, house work, managing money, medications, shopping or transportation. Reports no falls.  The home doesn't have grab bars in the bathroom. Has handrails on the stairs. The home does not have poor lighting or loose rugs.   Wears hearing aids. No diet restrictions. Eats well balanced diet.  No recent hospitalizations.     Feels health is good.       Has no significant chronic pain (pain scale 1-2/10).       Regularly exercises.      No cognitive impairment.      No DM or nephropathy.      Discussed getting yearly flu shots.  Has had 2 pneumonia shots. Got Shingrix series.       Patient Care Team:  Scotty Loyd PA-C as PCP - General  Alphonso Rodarte MD as PCP - [facility] [employer] PCP   Specialists seen:  Dr Carter (cardio)  Dr Kirkland (cardio)  Mariana Doyle CNP (cardio)   Dr Luna (Derm)  "1-2 times per year.   Dr Kevin Abraham (ortho)    Review of Systems   Respiratory:  Negative for shortness of breath.    Cardiovascular:  Negative for chest pain and palpitations.   Gastrointestinal:  Negative for abdominal pain.       Objective   Vitals:  /78   Pulse 51   Temp 35.8 °C (96.5 °F)   Ht 1.727 m (5' 8\")   Wt 106 kg (232 lb 12.8 oz)   SpO2 99%   BMI 35.40 kg/m²       Physical Exam  Vitals and nursing note reviewed.   Constitutional:       Appearance: He is well-developed. He is obese.   Eyes:      General: No scleral icterus.  Cardiovascular:      Rate and Rhythm: Normal rate and regular rhythm.   Pulmonary:      Effort: Pulmonary effort is normal.      Breath sounds: Normal breath sounds.   Abdominal:      Palpations: Abdomen is soft. There is no mass.      Tenderness: There is no abdominal tenderness.   Musculoskeletal:      Cervical back: Neck supple.   Skin:     General: Skin is warm and dry.   Neurological:      Mental Status: He is alert.         Assessment & Plan  Benign essential hypertension         Coronary artery disease involving native coronary artery of native heart without angina pectoris         Dyslipidemia         S/P PTCA (percutaneous transluminal coronary angioplasty)         Atrial fibrillation, unspecified type (Multi)         Obesity, morbid (Multi)         Routine general medical examination at health care facility                 Discussed wellness and safety issues.   Discussed diet and exercise.   Install grab bars.   Get AAA Screening ultrasound due to hx of smoking.  Patient refuses to do -- feels like he is fine and wouldn't do any procedure to fix it even if he had one.   Recommend flu shot every fall.   Get Tdap and RSV vaccines at pharmacy.   "

## 2024-12-29 ASSESSMENT — ENCOUNTER SYMPTOMS
SHORTNESS OF BREATH: 0
PALPITATIONS: 0
ABDOMINAL PAIN: 0

## 2025-01-06 DIAGNOSIS — I25.10 ATHEROSCLEROTIC HEART DISEASE OF NATIVE CORONARY ARTERY WITHOUT ANGINA PECTORIS: ICD-10-CM

## 2025-01-06 DIAGNOSIS — E78.5 HYPERLIPIDEMIA, UNSPECIFIED: ICD-10-CM

## 2025-01-07 ENCOUNTER — APPOINTMENT (OUTPATIENT)
Dept: CARDIOLOGY | Facility: HOSPITAL | Age: 70
End: 2025-01-07
Payer: COMMERCIAL

## 2025-01-10 NOTE — PROGRESS NOTES
"Counseling:  The patient was counseled regarding diagnostic results, instructions for management, risk factor reductions, prognosis, patient and family education, impressions, risks and benefits of treatment options and importance of compliance with treatment.      Chief Complaint:   The patient presents today for annual followup of CAD, HTN, hyperlipidemia and a-fib.      History Of Present Illness:    Duane Flowers is a 69 year old male patient who presents today for annual followup of CAD, dyslipidemia and HTN. His PMH is significant for CAD s/p PCI/stent to the LAD and first diagonal (most recently in 2013), dyslipidemia, umbilical hernia s/p repair summer 2022, and HTN. Over the past year, the patient states that he has done well from a cardiac standpoint. He denies any CP, chest discomfort or SOB. His only complaint today is that of his urine being darker than usual. BP has been stable. EKG today shows that the patient is in sinus rhythm. The patient is compliant with his prescribed medications.     Last Recorded Vitals:  Vitals:    01/13/25 1244   BP: 130/80   BP Location: Left arm   Pulse: 58   Weight: 106 kg (233 lb)   Height: 1.702 m (5' 7\")       Past Surgical History:  He has a past surgical history that includes Other surgical history (11/06/2018); Other surgical history (11/06/2018); Other surgical history (11/06/2018); Other surgical history (11/29/2021); and XR shoulder.      Social History:  He reports that he quit smoking about 37 years ago. His smoking use included cigarettes. He has never used smokeless tobacco. He reports that he does not currently use alcohol. He reports that he does not use drugs.    Family History:  Family History   Problem Relation Name Age of Onset    Cancer Mother      Cancer Father      Diabetes Father      Coronary artery disease Father      Coronary artery disease Brother          Allergies:  Patient has no known allergies.    Outpatient Medications:  Current Outpatient " Medications   Medication Instructions    apixaban (Eliquis) 5 mg tablet TAKE 1 TABLET BY MOUTH TWO TIMES A DAY    atorvastatin (LIPITOR) 80 mg, oral, Nightly    CHELATED ZINC ORAL 50 mg, Daily    cholecalciferol (Vitamin D-3) 125 MCG (5000 UT) capsule Take by mouth.    elderberry fruit and flower 460-115 mg capsule Take by mouth.    losartan (COZAAR) 50 mg, oral, Daily    metoprolol succinate XL (TOPROL-XL) 50 mg, oral, Daily     Review of Systems   Genitourinary:         Dark colored urine   All other systems reviewed and are negative.     Physical Exam:  Constitutional:       Appearance: Healthy appearance. Not in distress.   Neck:      Vascular: No JVR. JVD normal.   Pulmonary:      Effort: Pulmonary effort is normal.      Breath sounds: Normal breath sounds. No wheezing. No rhonchi. No rales.   Chest:      Chest wall: Not tender to palpatation.   Cardiovascular:      PMI at left midclavicular line. Normal rate. Regular rhythm. Normal S1. Normal S2.       Murmurs: There is no murmur.      No gallop.  No click. No rub.   Pulses:     Intact distal pulses.   Edema:     Peripheral edema absent.   Abdominal:      General: Bowel sounds are normal.      Palpations: Abdomen is soft.      Tenderness: There is no abdominal tenderness.   Musculoskeletal: Normal range of motion.         General: No tenderness. Skin:     General: Skin is warm and dry.   Neurological:      General: No focal deficit present.      Mental Status: Alert and oriented to person, place and time.          Last Labs:  CBC -  Lab Results   Component Value Date    WBC 9.1 12/27/2023    HGB 15.8 12/27/2023    HCT 49.4 12/27/2023    MCV 88 12/27/2023     12/27/2023       CMP -  Lab Results   Component Value Date    CALCIUM 9.3 12/27/2023    PROT 6.2 (L) 12/27/2023    ALBUMIN 4.2 12/27/2023    AST 16 12/27/2023    ALT 27 12/27/2023    ALKPHOS 107 12/27/2023    BILITOT 1.1 12/27/2023       LIPID PANEL -   Lab Results   Component Value Date    CHOL 137  12/27/2023    TRIG 99 12/27/2023    HDL 40.7 12/27/2023    CHHDL 3.4 12/27/2023    LDLF 69 06/07/2022    VLDL 20 12/27/2023    NHDL 96 12/27/2023       RENAL FUNCTION PANEL -   Lab Results   Component Value Date    GLUCOSE 96 12/27/2023     12/27/2023    K 4.4 12/27/2023     12/27/2023    CO2 26 12/27/2023    ANIONGAP 11 12/27/2023    BUN 22 12/27/2023    CREATININE 0.92 12/27/2023    GFRMALE >90 05/05/2023    CALCIUM 9.3 12/27/2023    ALBUMIN 4.2 12/27/2023        Last Cardiology Tests:  05/22/2023 - Electrophysiology Procedure  A-Fib RFA.    04/04/2023 - [facility]  1. Left ventricular systolic function is normal.  2. Successful direct current cardioversion for atrial fibrillation resulting in normal sinus rhythm.  3. No left atrial mass.  4. No left atrial thrombus.    04/03/2023 - Stress Test  1. Normal stress myocardial perfusion imaging in response to pharmacologic stress. Mild left ventricular systolic dysfunction on post stress gated imaging.  2. Normal Stress Test. No clinical or electrocardiographic evidence for ischemia at maximal infusion.    04/03/2023 - TTE  1. Left ventricular systolic function is normal with a 60-65% estimated ejection fraction.  2. Moderately increased left ventricular septal thickness.  3. There is low normal right ventricular systolic function.    12/13/2022 - TTE  1. Left ventricular systolic function is normal with a 60% estimated ejection fraction.  2. There is moderate concentric left ventricular hypertrophy.    06/17/2021 - Stress Test  1. Normal perfusion without evidence of ischemia. Calculated ejection fraction of 63% without segmental wall motion abnormality seen.  2. Abnormal EKG treadmill stress test secondary to EKG changes without complaint of chest pain. The Woo score is 3.     05/16/2013 -  Cardiac Catheterization/Left Ventriculography/Coronary Angiography/Stent Placement/Angioplasty  1. Severe single vessel coronary artery disease.  2. SUccessful stenting  and successful angioplasty of the 100% in-stent restenosis of the pre-existing stent in the mid left anterior descending artery.   3. Successful angioplasty and successful stenting of the 80% stenosis in the proximal first diagonal artery.  4. The left ventricular ejection fraction is 55%.  5. Normal left ventricular size and contractility.     Lab review: I have personally reviewed the laboratory result(s).    Assessment/Plan   1) CAD s/p PCI LAD and 1st diagonal in remote past 2013  On atorvastatin 80 mg daily, losartan 50 mg daily, metoprolol succinate 50 mg daily  Counselled about diet, exercise  TTE 04/2023 with LVEF 60%, moderate concentric LVH   Denies CP, chest discomfort or SOB  BP stable  Continue current medical Rx   F/U 1 year      2) Hyperlipidemia  On atorvastatin 80 mg daily  Goal LDL <70  Lipid panel 12/27/2023 with LDL of 77; near goal  Continue current medical Rx   F/U 1 year      3) HTN  Stable  On losartan 50 mg daily, metoprolol succinate 50 mg daily  Continue current medical Rx   F/U 1 year     4) Atrial Fibrillation s/p DCC and Subsequent RFA  On Eliquis 5 mg BID, metoprolol succinate 50 mg daily  Hospital admission 04/2023 with new onset a-fib with RVR  TTE with LVEF 60%, moderate concentric LVH   Stress negative for ischemia  S/P [facility] DCC  S/P RFA 05/22/2023  In NSR  Continue current medical Rx   F/U 1 year      5) Exertional SOB  TTE 12/13/2022 with LVEF 60%,  moderate concentric LVH   Resolved    6) Dark Colored Urine  Reports that urine has been darker than usual  Denies h/o prostate issues   Check urinalysis - call with results       Scribe Attestation  By signing my name below, I, Chelsea Amaro   attest that this documentation has been prepared under the direction and in the presence of Ashish Carter MD.

## 2025-01-13 ENCOUNTER — APPOINTMENT (OUTPATIENT)
Dept: CARDIOLOGY | Facility: HOSPITAL | Age: 70
End: 2025-01-13
Payer: COMMERCIAL

## 2025-01-13 VITALS
BODY MASS INDEX: 36.57 KG/M2 | HEIGHT: 67 IN | SYSTOLIC BLOOD PRESSURE: 130 MMHG | DIASTOLIC BLOOD PRESSURE: 80 MMHG | HEART RATE: 58 BPM | WEIGHT: 233 LBS

## 2025-01-13 DIAGNOSIS — I25.10 CORONARY ARTERY DISEASE INVOLVING NATIVE CORONARY ARTERY OF NATIVE HEART WITHOUT ANGINA PECTORIS: ICD-10-CM

## 2025-01-13 DIAGNOSIS — R31.9 HEMATURIA: Primary | ICD-10-CM

## 2025-01-13 LAB
ATRIAL RATE: 58 BPM
P AXIS: 30 DEGREES
P OFFSET: 185 MS
P ONSET: 136 MS
PR INTERVAL: 168 MS
Q ONSET: 220 MS
QRS COUNT: 9 BEATS
QRS DURATION: 84 MS
QT INTERVAL: 384 MS
QTC CALCULATION(BAZETT): 376 MS
QTC FREDERICIA: 379 MS
R AXIS: 13 DEGREES
T AXIS: -10 DEGREES
T OFFSET: 412 MS
VENTRICULAR RATE: 58 BPM

## 2025-01-13 PROCEDURE — 1123F ACP DISCUSS/DSCN MKR DOCD: CPT | Performed by: INTERNAL MEDICINE

## 2025-01-13 PROCEDURE — 99213 OFFICE O/P EST LOW 20 MIN: CPT | Performed by: INTERNAL MEDICINE

## 2025-01-13 PROCEDURE — 3075F SYST BP GE 130 - 139MM HG: CPT | Performed by: INTERNAL MEDICINE

## 2025-01-13 PROCEDURE — 93010 ELECTROCARDIOGRAM REPORT: CPT | Performed by: INTERNAL MEDICINE

## 2025-01-13 PROCEDURE — 1036F TOBACCO NON-USER: CPT | Performed by: INTERNAL MEDICINE

## 2025-01-13 PROCEDURE — 3079F DIAST BP 80-89 MM HG: CPT | Performed by: INTERNAL MEDICINE

## 2025-01-13 PROCEDURE — 1160F RVW MEDS BY RX/DR IN RCRD: CPT | Performed by: INTERNAL MEDICINE

## 2025-01-13 PROCEDURE — 99213 OFFICE O/P EST LOW 20 MIN: CPT | Mod: 25 | Performed by: INTERNAL MEDICINE

## 2025-01-13 PROCEDURE — 1159F MED LIST DOCD IN RCRD: CPT | Performed by: INTERNAL MEDICINE

## 2025-01-13 PROCEDURE — 93005 ELECTROCARDIOGRAM TRACING: CPT | Performed by: INTERNAL MEDICINE

## 2025-01-13 PROCEDURE — 3008F BODY MASS INDEX DOCD: CPT | Performed by: INTERNAL MEDICINE

## 2025-01-13 ASSESSMENT — ENCOUNTER SYMPTOMS
LOSS OF SENSATION IN FEET: 0
OCCASIONAL FEELINGS OF UNSTEADINESS: 0
DEPRESSION: 0

## 2025-01-13 NOTE — PATIENT INSTRUCTIONS
Continue all current medications as prescribed.   Please have blood work drawn at your earliest convenience. You will be notified of the results once they become available.    Dr. Carter has ordered a urinalysis. You will be notified of the results once they become available.    Followup with Dr. Carter in 1 year, sooner should any issues or concerns arise before then.     If you have any questions or cardiac concerns, please call our office at [phone].

## 2025-01-17 ENCOUNTER — LAB (OUTPATIENT)
Dept: LAB | Facility: LAB | Age: 70
End: 2025-01-17
Payer: MEDICARE

## 2025-01-17 DIAGNOSIS — R31.9 HEMATURIA: ICD-10-CM

## 2025-01-17 DIAGNOSIS — I25.10 CORONARY ARTERY DISEASE INVOLVING NATIVE CORONARY ARTERY OF NATIVE HEART WITHOUT ANGINA PECTORIS: ICD-10-CM

## 2025-01-17 LAB
ALBUMIN SERPL BCP-MCNC: 4.2 G/DL (ref 3.4–5)
ALP SERPL-CCNC: 122 U/L (ref 33–136)
ALT SERPL W P-5'-P-CCNC: 21 U/L (ref 10–52)
ANION GAP SERPL CALC-SCNC: 11 MMOL/L (ref 10–20)
APPEARANCE UR: CLEAR
AST SERPL W P-5'-P-CCNC: 14 U/L (ref 9–39)
BASOPHILS # BLD AUTO: 0.08 X10*3/UL (ref 0–0.1)
BASOPHILS NFR BLD AUTO: 0.7 %
BILIRUB SERPL-MCNC: 1 MG/DL (ref 0–1.2)
BILIRUB UR STRIP.AUTO-MCNC: NEGATIVE MG/DL
BUN SERPL-MCNC: 18 MG/DL (ref 6–23)
CALCIUM SERPL-MCNC: 9.3 MG/DL (ref 8.6–10.3)
CHLORIDE SERPL-SCNC: 107 MMOL/L (ref 98–107)
CHOLEST SERPL-MCNC: 121 MG/DL (ref 0–199)
CHOLESTEROL/HDL RATIO: 3.4
CO2 SERPL-SCNC: 26 MMOL/L (ref 21–32)
COLOR UR: YELLOW
CREAT SERPL-MCNC: 0.81 MG/DL (ref 0.5–1.3)
EGFRCR SERPLBLD CKD-EPI 2021: >90 ML/MIN/1.73M*2
EOSINOPHIL # BLD AUTO: 0.19 X10*3/UL (ref 0–0.7)
EOSINOPHIL NFR BLD AUTO: 1.7 %
ERYTHROCYTE [DISTWIDTH] IN BLOOD BY AUTOMATED COUNT: 13.5 % (ref 11.5–14.5)
GLUCOSE SERPL-MCNC: 102 MG/DL (ref 74–99)
GLUCOSE UR STRIP.AUTO-MCNC: NORMAL MG/DL
HCT VFR BLD AUTO: 50.1 % (ref 41–52)
HDLC SERPL-MCNC: 35.6 MG/DL
HGB BLD-MCNC: 15.5 G/DL (ref 13.5–17.5)
IMM GRANULOCYTES # BLD AUTO: 0.03 X10*3/UL (ref 0–0.7)
IMM GRANULOCYTES NFR BLD AUTO: 0.3 % (ref 0–0.9)
KETONES UR STRIP.AUTO-MCNC: NEGATIVE MG/DL
LDLC SERPL CALC-MCNC: 63 MG/DL
LEUKOCYTE ESTERASE UR QL STRIP.AUTO: NEGATIVE
LYMPHOCYTES # BLD AUTO: 1.67 X10*3/UL (ref 1.2–4.8)
LYMPHOCYTES NFR BLD AUTO: 15.3 %
MCH RBC QN AUTO: 29.1 PG (ref 26–34)
MCHC RBC AUTO-ENTMCNC: 30.9 G/DL (ref 32–36)
MCV RBC AUTO: 94 FL (ref 80–100)
MONOCYTES # BLD AUTO: 1 X10*3/UL (ref 0.1–1)
MONOCYTES NFR BLD AUTO: 9.1 %
MUCOUS THREADS #/AREA URNS AUTO: NORMAL /LPF
NEUTROPHILS # BLD AUTO: 7.98 X10*3/UL (ref 1.2–7.7)
NEUTROPHILS NFR BLD AUTO: 72.9 %
NITRITE UR QL STRIP.AUTO: NEGATIVE
NON HDL CHOLESTEROL: 85 MG/DL (ref 0–149)
NRBC BLD-RTO: 0 /100 WBCS (ref 0–0)
PH UR STRIP.AUTO: 5 [PH]
PLATELET # BLD AUTO: 222 X10*3/UL (ref 150–450)
POTASSIUM SERPL-SCNC: 4.6 MMOL/L (ref 3.5–5.3)
PROT SERPL-MCNC: 6.4 G/DL (ref 6.4–8.2)
PROT UR STRIP.AUTO-MCNC: NORMAL MG/DL
RBC # BLD AUTO: 5.32 X10*6/UL (ref 4.5–5.9)
RBC # UR STRIP.AUTO: NEGATIVE /UL
RBC #/AREA URNS AUTO: NORMAL /HPF
SODIUM SERPL-SCNC: 139 MMOL/L (ref 136–145)
SP GR UR STRIP.AUTO: 1.03
TRIGL SERPL-MCNC: 114 MG/DL (ref 0–149)
UROBILINOGEN UR STRIP.AUTO-MCNC: NORMAL MG/DL
VLDL: 23 MG/DL (ref 0–40)
WBC # BLD AUTO: 11 X10*3/UL (ref 4.4–11.3)
WBC #/AREA URNS AUTO: NORMAL /HPF

## 2025-01-17 PROCEDURE — 80061 LIPID PANEL: CPT

## 2025-01-17 PROCEDURE — 80053 COMPREHEN METABOLIC PANEL: CPT

## 2025-01-17 PROCEDURE — 81001 URINALYSIS AUTO W/SCOPE: CPT

## 2025-01-17 PROCEDURE — 85025 COMPLETE CBC W/AUTO DIFF WBC: CPT

## 2025-01-20 DIAGNOSIS — I25.10 ATHEROSCLEROTIC HEART DISEASE OF NATIVE CORONARY ARTERY WITHOUT ANGINA PECTORIS: ICD-10-CM

## 2025-01-20 DIAGNOSIS — I48.91 ATRIAL FIBRILLATION, UNSPECIFIED TYPE (MULTI): ICD-10-CM

## 2025-01-20 RX ORDER — METOPROLOL SUCCINATE 50 MG/1
50 TABLET, EXTENDED RELEASE ORAL DAILY
Qty: 90 TABLET | Refills: 3 | Status: SHIPPED | OUTPATIENT
Start: 2025-01-20

## 2025-01-20 RX ORDER — CLOPIDOGREL BISULFATE 75 MG/1
75 TABLET ORAL DAILY
Qty: 90 TABLET | Refills: 3 | Status: SHIPPED | OUTPATIENT
Start: 2025-01-20

## 2025-01-20 RX ORDER — APIXABAN 5 MG/1
5 TABLET, FILM COATED ORAL 2 TIMES DAILY
Qty: 180 TABLET | Refills: 3 | Status: SHIPPED | OUTPATIENT
Start: 2025-01-20

## 2025-01-20 RX ORDER — ATORVASTATIN CALCIUM 80 MG/1
80 TABLET, FILM COATED ORAL NIGHTLY
Qty: 90 TABLET | Refills: 3 | Status: SHIPPED | OUTPATIENT
Start: 2025-01-20

## 2025-04-20 DIAGNOSIS — I10 ESSENTIAL (PRIMARY) HYPERTENSION: ICD-10-CM

## 2025-04-28 RX ORDER — LOSARTAN POTASSIUM 50 MG/1
50 TABLET ORAL DAILY
Qty: 90 TABLET | Refills: 2 | Status: SHIPPED | OUTPATIENT
Start: 2025-04-28

## 2025-07-02 ENCOUNTER — PATIENT OUTREACH (OUTPATIENT)
Dept: PRIMARY CARE | Facility: CLINIC | Age: 70
End: 2025-07-02
Payer: MEDICARE

## 2025-07-02 ENCOUNTER — TELEPHONE (OUTPATIENT)
Dept: PRIMARY CARE | Facility: CLINIC | Age: 70
End: 2025-07-02
Payer: MEDICARE

## 2025-07-02 NOTE — TELEPHONE ENCOUNTER
MAYO....Scheduled pt for hosp follow up on 7/11 at 1150am.  You did not have any 40 min openings any time soon and pt needed to get in ASAP.  Pt was at [facility] for falling 17 feet out of a tree at home

## 2025-07-02 NOTE — PROGRESS NOTES
Discharge Facility: [facility]   Discharge Diagnosis: Fall, initial encounter (Primary Dx);   Closed head injury, initial encounter;   Closed fracture of multiple ribs, unspecified laterality, initial encounter;   Closed fracture of multiple ribs of left side, initial encounter;   Closed fracture of multiple thoracic vertebrae, initial encounter (HCC);   Acute pain due to trauma;   At risk for delirium     Admission Date: 6/27/25  Discharge Date: 7/1/25     PCP Appointment Date:  7/11/25  Specialist Appointment Date: 7/15/25 - [facility] Trauma   Hospital Encounter and Summary Linked:    Hospital Encounter with Alix Gomez DO; Marie Olivier MD     See discharge assessment below for further details  Wrap Up  Wrap Up Additional Comments: Pt. Reports since discharge, he is doing well. His pain levels do go up to 10, but with PRN pain medications. He is doing a schedule of pain meds at 0600 1200 1800 and midnight. Pt. Is going to use PRN Robaxin as well. The pt. And the pt. Wife have concerns of an elevated WBC of 22.2 on 7/1/25 Pt. To have repeat CBC on this date. Denies further questions/concerns at this time. This callers contact information for non-emergent questions/concerns. (7/3/2025  8:13 AM)    Engagement  Call Start Time: -- (Call completed with Duane) (7/3/2025  8:13 AM)    Medications  Medications reviewed with patient/caregiver?: Yes (7/3/2025  8:13 AM)  Is the patient having any side effects they believe may be caused by any medication additions or changes?: No (7/3/2025  8:13 AM)  Does the patient have all medications ordered at discharge?: Yes (7/3/2025  8:13 AM)  Care Management Interventions: No intervention needed (7/3/2025  8:13 AM)  Prescription Comments: PT. has all medications at time of discharge (7/3/2025  8:13 AM)  Is the patient taking all medications as directed (includes completed medication regime)?: Yes (7/3/2025  8:13 AM)  Care Management Interventions: Provided patient education  (7/3/2025  8:13 AM)  Medication Comments: Pt. denies any med questions concerns during this TCM call (7/3/2025  8:13 AM)    Appointments  Does the patient have a primary care provider?: Yes (7/3/2025  8:13 AM)  Care Management Interventions: Verified appointment date/time/provider; Educated patient on importance of making appointment (7/3/2025  8:13 AM)  Has the patient kept scheduled appointments due by today?: Yes (7/3/2025  8:13 AM)  Care Management Interventions: Advised patient to keep appointment (7/3/2025  8:13 AM)    Self Management  What is the home health agency?: n/a (7/3/2025  8:13 AM)  Has home health visited the patient within 72 hours of discharge?: Not applicable (7/3/2025  8:13 AM)  What Durable Medical Equipment (DME) was ordered?: n/a (7/3/2025  8:13 AM)    Patient Teaching  Does the patient have access to their discharge instructions?: Yes (7/3/2025  8:13 AM)  Care Management Interventions: Reviewed instructions with patient (7/3/2025  8:13 AM)  What is the patient's perception of their health status since discharge?: Improving (7/3/2025  8:13 AM)  Is the patient/caregiver able to teach back the hierarchy of who to call/visit for symptoms/problems? PCP, Specialist, Home Health nurse, Urgent Care, ED, 911: Yes (7/3/2025  8:13 AM)  Patient/Caregiver Education Comments: Pt. denies any new concerns post hospital discharge (7/3/2025  8:13 AM)

## 2025-07-03 RX ORDER — GABAPENTIN 100 MG/1
100 CAPSULE ORAL 3 TIMES DAILY
COMMUNITY
Start: 2025-07-01 | End: 2025-07-15

## 2025-07-03 RX ORDER — OXYCODONE HYDROCHLORIDE 5 MG/1
5 TABLET ORAL EVERY 6 HOURS PRN
COMMUNITY
Start: 2025-07-01 | End: 2025-07-08

## 2025-07-03 RX ORDER — SENNOSIDES 8.6 MG/1
8.6 TABLET ORAL
COMMUNITY
Start: 2025-07-01 | End: 2025-07-31

## 2025-07-03 RX ORDER — METHOCARBAMOL 500 MG/1
1000 TABLET, FILM COATED ORAL EVERY 6 HOURS
COMMUNITY
Start: 2025-07-01 | End: 2025-07-15

## 2025-07-03 RX ORDER — LIDOCAINE 560 MG/1
1 PATCH PERCUTANEOUS; TOPICAL; TRANSDERMAL
COMMUNITY
Start: 2025-07-01

## 2025-07-03 RX ORDER — DOCUSATE SODIUM 100 MG/1
100 CAPSULE, LIQUID FILLED ORAL 2 TIMES DAILY
COMMUNITY
Start: 2025-07-01 | End: 2025-07-11

## 2025-07-03 RX ORDER — ONDANSETRON 4 MG/1
4 TABLET, ORALLY DISINTEGRATING ORAL EVERY 8 HOURS PRN
COMMUNITY
Start: 2025-07-01 | End: 2025-07-08

## 2025-07-07 DIAGNOSIS — D64.9 ANEMIA, UNSPECIFIED TYPE: Primary | ICD-10-CM

## 2025-07-07 DIAGNOSIS — R79.89 ELEVATED LFTS: ICD-10-CM

## 2025-07-09 DIAGNOSIS — R79.89 ELEVATED LFTS: ICD-10-CM

## 2025-07-09 DIAGNOSIS — D64.9 ANEMIA, UNSPECIFIED TYPE: ICD-10-CM

## 2025-07-11 ENCOUNTER — APPOINTMENT (OUTPATIENT)
Dept: PRIMARY CARE | Facility: CLINIC | Age: 70
End: 2025-07-11
Payer: MEDICARE

## 2025-07-11 VITALS
HEART RATE: 54 BPM | TEMPERATURE: 97.1 F | WEIGHT: 239 LBS | BODY MASS INDEX: 37.43 KG/M2 | OXYGEN SATURATION: 100 % | SYSTOLIC BLOOD PRESSURE: 126 MMHG | DIASTOLIC BLOOD PRESSURE: 82 MMHG
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
## 2025-07-11 DIAGNOSIS — D50.0 BLOOD LOSS ANEMIA: ICD-10-CM

## 2025-07-11 DIAGNOSIS — S22.42XA CLOSED FRACTURE OF MULTIPLE RIBS OF LEFT SIDE, INITIAL ENCOUNTER: Primary | ICD-10-CM

## 2025-07-11 DIAGNOSIS — D72.829 LEUKOCYTOSIS, UNSPECIFIED TYPE: ICD-10-CM

## 2025-07-11 DIAGNOSIS — R91.1 NODULE OF LOWER LOBE OF LEFT LUNG: ICD-10-CM

## 2025-07-11 DIAGNOSIS — S22.008A: ICD-10-CM

## 2025-07-11 PROCEDURE — 99495 TRANSJ CARE MGMT MOD F2F 14D: CPT | Performed by: PHYSICIAN ASSISTANT

## 2025-07-11 PROCEDURE — 1159F MED LIST DOCD IN RCRD: CPT | Performed by: PHYSICIAN ASSISTANT

## 2025-07-11 PROCEDURE — 3079F DIAST BP 80-89 MM HG: CPT | Performed by: PHYSICIAN ASSISTANT

## 2025-07-11 PROCEDURE — 3074F SYST BP LT 130 MM HG: CPT | Performed by: PHYSICIAN ASSISTANT

## 2025-07-11 PROCEDURE — 1160F RVW MEDS BY RX/DR IN RCRD: CPT | Performed by: PHYSICIAN ASSISTANT

## 2025-07-11 NOTE — PROGRESS NOTES
Subjective   Patient ID: Duane D Flowers is a 69 y.o. male who presents for Hospital Follow-up ([facility] on[facility] Re: rib fx).    HPI   Patient presents for hospital follow up.    Admission date: 6/27/25  Discharge date:  7/1/25  Discharge diagnosis: Closed head injury, initial encounter;   Closed fracture of multiple ribs, unspecified laterality, initial encounter;   Closed fracture of multiple ribs of left side, initial encounter;   Closed fracture of multiple thoracic vertebrae, initial encounter   Telephone outreach with patient after discharge: 7/2/25  Face-to-Face visit date: 7/11/25  Medical complexity decision-making: moderate  Reviewed discharge summary, lab/test results, and hospital records.  Reconciled med list.   Specialist follow-ups?   Trauma Clinic 7/15/25      Patient presented to the ED as a Level 2 trauma activation after a falling 15-17 ft off a ladder while cutting down some branches. Taken by EMS to ER.  Patient was evalauted and found to have the following injuries:  Left 5-9 posterior rib fractures  Left 5-8 thoracic spinous process fractures  Was found to have Leukocytosis, felt to be reactive. Trended down during admission.    Incidentally found 8mm medial left lung base nodule noted on CT 6/27/25. Repeat CT recommend to monitor stability 12/2025 and 12/2026.  7/3/25 WBC 19.5;  Hgb 9.6.     Doesn't think he injured his head. No swelling on head or tenderness on head. No headaches or neck pain. No vision changes. No confusion.     Weaning down off oxycodone -- just taking 1 at bedtime. Plans to no longer need this after a couple more days.   Using Tylenol 500mg 2 TID.   Using Robaxin 2 QID, Gabapentin 100mg TID, and some Lidocaine patches.     Pain is controlled well. Is improving quite a bit.   No SOB or chest pain. No headaches.   Using incentive spirometer.   No abdominal pain.   No hematuria or blood/melena in stools.      reports that he does not currently use alcohol.    Review of  Systems   Constitutional:  Negative for chills and fever.   Respiratory:  Negative for cough and shortness of breath.    Cardiovascular:  Negative for chest pain.       Objective   /82   Pulse 54   Temp 36.2 °C (97.1 °F)   Wt 108 kg (239 lb)   SpO2 100%   BMI 37.43 kg/m²     Physical Exam  Vitals and nursing note reviewed.   HENT:      Head: Normocephalic.     Eyes:      General: No scleral icterus.      Cardiovascular:      Rate and Rhythm: Normal rate and regular rhythm.   Pulmonary:      Effort: Pulmonary effort is normal.      Breath sounds: Normal breath sounds.   Abdominal:      Palpations: Abdomen is soft. There is no mass.      Tenderness: There is no abdominal tenderness.     Musculoskeletal:      Comments: Has tenderness over left 5-9 posterior ribs and left side of thoracic spine.      Skin:     General: Skin is warm and dry.      Comments: Has nontender ecchymoses on left hip/buttock area, left lateral thigh, and left flank area.       Neurological:      Mental Status: He is alert.     Psychiatric:         Mood and Affect: Affect normal.         Assessment/Plan   Diagnoses and all orders for this visit:  Closed fracture of multiple ribs of left side, initial encounter  Closed fracture of spinous process of thoracic vertebra, initial encounter (Multi)  Leukocytosis, unspecified type  Blood loss anemia  Nodule of lower lobe of left lung  Fall, initial encounter       Reviewed hospital records.   Patient is recovering well.   Continue meds.   Follow up with trauma specialist next week, and will have repeat CBC done there.   Follow up after discharged from trauma service, otherwise he has routine follow up later this year.   Will need repeat CT lung to monitor lung nodule 12/2025.

## 2025-07-14 ENCOUNTER — PATIENT OUTREACH (OUTPATIENT)
Dept: PRIMARY CARE | Facility: CLINIC | Age: 70
End: 2025-07-14
Payer: MEDICARE

## 2025-07-14 NOTE — PROGRESS NOTES
Unable to reach patient for follow up call after recent hospitalization.   Left voicemail with call back number for patient to call if needed  to assist with any questions or concerns patient may have.    Office Visit with Scotty Loyd PA-C (07/11/2025)

## 2025-07-18 ASSESSMENT — ENCOUNTER SYMPTOMS
CHILLS: 0
COUGH: 0
FEVER: 0
SHORTNESS OF BREATH: 0

## 2025-08-05 ENCOUNTER — PATIENT OUTREACH (OUTPATIENT)
Dept: PRIMARY CARE | Facility: CLINIC | Age: 70
End: 2025-08-05
Payer: MEDICARE

## 2025-08-07 ENCOUNTER — TELEPHONE (OUTPATIENT)
Dept: CARDIOLOGY | Facility: HOSPITAL | Age: 70
End: 2025-08-07
Payer: MEDICARE

## 2025-08-07 NOTE — TELEPHONE ENCOUNTER
Patient called in, when doing anything strenuous is having a burning feeling that starts in his esophagus, travels up into his neck, and then into his temples. Patient was scheduled first available, was also instructed that if symptoms get severe enough, to go to ED, patient verbalized understanding.

## 2025-08-08 NOTE — TELEPHONE ENCOUNTER
Spoke with pt has occasional burning in the esophagus, has never been scoped.  Wants to see Dr. Carter first before being seen by a GI specialist.  Pt has appt on Aug 19th.  I told pt if he feels worse or pain is constant to call me back.  Pt verbalized understanding.

## 2025-08-18 RX ORDER — GABAPENTIN 100 MG/1
100 CAPSULE ORAL
COMMUNITY
Start: 2025-07-15

## 2025-08-19 ENCOUNTER — OFFICE VISIT (OUTPATIENT)
Dept: CARDIOLOGY | Facility: HOSPITAL | Age: 70
End: 2025-08-19
Payer: MEDICARE

## 2025-08-19 ENCOUNTER — ANCILLARY PROCEDURE (OUTPATIENT)
Dept: CARDIOLOGY | Facility: HOSPITAL | Age: 70
End: 2025-08-19
Payer: MEDICARE

## 2025-08-19 ENCOUNTER — LAB (OUTPATIENT)
Dept: LAB | Facility: HOSPITAL | Age: 70
End: 2025-08-19
Payer: MEDICARE

## 2025-08-19 VITALS
BODY MASS INDEX: 36.1 KG/M2 | DIASTOLIC BLOOD PRESSURE: 80 MMHG | HEART RATE: 65 BPM | SYSTOLIC BLOOD PRESSURE: 128 MMHG | HEIGHT: 67 IN | WEIGHT: 230 LBS

## 2025-08-19 DIAGNOSIS — I48.91 UNSPECIFIED ATRIAL FIBRILLATION (MULTI): ICD-10-CM

## 2025-08-19 DIAGNOSIS — I20.0 UNSTABLE ANGINA (MULTI): Primary | ICD-10-CM

## 2025-08-19 DIAGNOSIS — I25.10 CORONARY ARTERIOSCLEROSIS: ICD-10-CM

## 2025-08-19 DIAGNOSIS — I25.10 ATHEROSCLEROTIC HEART DISEASE OF NATIVE CORONARY ARTERY WITHOUT ANGINA PECTORIS: ICD-10-CM

## 2025-08-19 DIAGNOSIS — I10 ESSENTIAL (PRIMARY) HYPERTENSION: ICD-10-CM

## 2025-08-19 DIAGNOSIS — I10 HYPERTENSION, UNSPECIFIED TYPE: ICD-10-CM

## 2025-08-19 DIAGNOSIS — E78.5 HYPERLIPIDEMIA, UNSPECIFIED: ICD-10-CM

## 2025-08-19 DIAGNOSIS — E78.5 HYPERLIPIDEMIA, UNSPECIFIED HYPERLIPIDEMIA TYPE: ICD-10-CM

## 2025-08-19 DIAGNOSIS — E66.01 MORBID (SEVERE) OBESITY DUE TO EXCESS CALORIES (MULTI): ICD-10-CM

## 2025-08-19 DIAGNOSIS — I48.91 ATRIAL FIBRILLATION WITH RAPID VENTRICULAR RESPONSE (MULTI): ICD-10-CM

## 2025-08-19 DIAGNOSIS — R55 SYNCOPE AND COLLAPSE: ICD-10-CM

## 2025-08-19 DIAGNOSIS — I10 BENIGN ESSENTIAL HYPERTENSION: ICD-10-CM

## 2025-08-19 LAB
ANION GAP SERPL CALC-SCNC: 13 MMOL/L (ref 10–20)
ATRIAL RATE: 65 BPM
BUN SERPL-MCNC: 15 MG/DL (ref 6–23)
CALCIUM SERPL-MCNC: 9.2 MG/DL (ref 8.6–10.3)
CHLORIDE SERPL-SCNC: 107 MMOL/L (ref 98–107)
CO2 SERPL-SCNC: 25 MMOL/L (ref 21–32)
CREAT SERPL-MCNC: 0.76 MG/DL (ref 0.5–1.3)
EGFRCR SERPLBLD CKD-EPI 2021: >90 ML/MIN/1.73M*2
ERYTHROCYTE [DISTWIDTH] IN BLOOD BY AUTOMATED COUNT: 14.8 % (ref 11.5–14.5)
GLUCOSE SERPL-MCNC: 89 MG/DL (ref 74–99)
HCT VFR BLD AUTO: 46.2 % (ref 41–52)
HGB BLD-MCNC: 14.7 G/DL (ref 13.5–17.5)
MCH RBC QN AUTO: 29.7 PG (ref 26–34)
MCHC RBC AUTO-ENTMCNC: 31.8 G/DL (ref 32–36)
MCV RBC AUTO: 93 FL (ref 80–100)
NRBC BLD-RTO: 0 /100 WBCS (ref 0–0)
P AXIS: 65 DEGREES
P OFFSET: 202 MS
P ONSET: 145 MS
PLATELET # BLD AUTO: 221 X10*3/UL (ref 150–450)
POTASSIUM SERPL-SCNC: 4.3 MMOL/L (ref 3.5–5.3)
PR INTERVAL: 150 MS
Q ONSET: 220 MS
QRS COUNT: 11 BEATS
QRS DURATION: 76 MS
QT INTERVAL: 378 MS
QTC CALCULATION(BAZETT): 393 MS
QTC FREDERICIA: 388 MS
R AXIS: 20 DEGREES
RBC # BLD AUTO: 4.95 X10*6/UL (ref 4.5–5.9)
SODIUM SERPL-SCNC: 141 MMOL/L (ref 136–145)
T AXIS: -18 DEGREES
T OFFSET: 409 MS
VENTRICULAR RATE: 65 BPM
WBC # BLD AUTO: 9.9 X10*3/UL (ref 4.4–11.3)

## 2025-08-19 PROCEDURE — 3079F DIAST BP 80-89 MM HG: CPT | Performed by: INTERNAL MEDICINE

## 2025-08-19 PROCEDURE — 3074F SYST BP LT 130 MM HG: CPT | Performed by: INTERNAL MEDICINE

## 2025-08-19 PROCEDURE — 99214 OFFICE O/P EST MOD 30 MIN: CPT | Performed by: INTERNAL MEDICINE

## 2025-08-19 PROCEDURE — 93005 ELECTROCARDIOGRAM TRACING: CPT | Mod: 59 | Performed by: INTERNAL MEDICINE

## 2025-08-19 PROCEDURE — 3008F BODY MASS INDEX DOCD: CPT | Performed by: INTERNAL MEDICINE

## 2025-08-19 PROCEDURE — 99213 OFFICE O/P EST LOW 20 MIN: CPT

## 2025-08-19 PROCEDURE — 1159F MED LIST DOCD IN RCRD: CPT | Performed by: INTERNAL MEDICINE

## 2025-08-19 PROCEDURE — 36415 COLL VENOUS BLD VENIPUNCTURE: CPT

## 2025-08-19 PROCEDURE — 85027 COMPLETE CBC AUTOMATED: CPT

## 2025-08-19 PROCEDURE — 80048 BASIC METABOLIC PNL TOTAL CA: CPT

## 2025-08-19 ASSESSMENT — ENCOUNTER SYMPTOMS
LOSS OF SENSATION IN FEET: 0
DEPRESSION: 0
LIGHT-HEADEDNESS: 1
OCCASIONAL FEELINGS OF UNSTEADINESS: 0

## 2025-08-21 ENCOUNTER — DOCUMENTATION (OUTPATIENT)
Dept: CARDIOLOGY | Facility: HOSPITAL | Age: 70
End: 2025-08-21
Payer: MEDICARE

## 2025-08-29 ENCOUNTER — DOCUMENTATION (OUTPATIENT)
Dept: CARDIOLOGY | Facility: HOSPITAL | Age: 70
End: 2025-08-29
Payer: MEDICARE

## 2025-09-02 ENCOUNTER — HOSPITAL ENCOUNTER (OUTPATIENT)
Facility: HOSPITAL | Age: 70
Setting detail: OUTPATIENT SURGERY
Discharge: HOME | End: 2025-09-02
Attending: INTERNAL MEDICINE | Admitting: INTERNAL MEDICINE
Payer: MEDICARE

## 2025-09-02 ENCOUNTER — APPOINTMENT (OUTPATIENT)
Dept: CARDIOLOGY | Facility: HOSPITAL | Age: 70
End: 2025-09-02
Payer: MEDICARE

## 2025-09-02 ENCOUNTER — PHARMACY VISIT (OUTPATIENT)
Dept: PHARMACY | Facility: CLINIC | Age: 70
End: 2025-09-02
Payer: COMMERCIAL

## 2025-09-02 VITALS
BODY MASS INDEX: 36.1 KG/M2 | HEART RATE: 63 BPM | DIASTOLIC BLOOD PRESSURE: 87 MMHG | HEIGHT: 67 IN | RESPIRATION RATE: 15 BRPM | SYSTOLIC BLOOD PRESSURE: 180 MMHG | TEMPERATURE: 97.8 F | WEIGHT: 230 LBS | OXYGEN SATURATION: 98 %

## 2025-09-02 DIAGNOSIS — I20.0 UNSTABLE ANGINA (MULTI): Primary | ICD-10-CM

## 2025-09-02 DIAGNOSIS — I25.110 ATHEROSCLEROTIC HEART DISEASE OF NATIVE CORONARY ARTERY WITH UNSTABLE ANGINA PECTORIS: ICD-10-CM

## 2025-09-02 DIAGNOSIS — I25.10 CORONARY ARTERIOSCLEROSIS: ICD-10-CM

## 2025-09-02 DIAGNOSIS — Z95.5 S/P PRIMARY ANGIOPLASTY WITH CORONARY STENT: ICD-10-CM

## 2025-09-02 DIAGNOSIS — I10 BENIGN ESSENTIAL HYPERTENSION: ICD-10-CM

## 2025-09-02 DIAGNOSIS — E78.5 HYPERLIPIDEMIA, UNSPECIFIED HYPERLIPIDEMIA TYPE: ICD-10-CM

## 2025-09-02 LAB
ACT BLD: 331 SEC (ref 83–199)
ACT BLD: >397 SEC (ref 83–199)
ATRIAL RATE: 57 BPM
P AXIS: 56 DEGREES
P OFFSET: 187 MS
P ONSET: 159 MS
PR INTERVAL: 124 MS
Q ONSET: 221 MS
QRS COUNT: 9 BEATS
QRS DURATION: 94 MS
QT INTERVAL: 426 MS
QTC CALCULATION(BAZETT): 414 MS
QTC FREDERICIA: 418 MS
R AXIS: 13 DEGREES
T AXIS: -24 DEGREES
T OFFSET: 434 MS
VENTRICULAR RATE: 57 BPM

## 2025-09-02 PROCEDURE — C1769 GUIDE WIRE: HCPCS | Performed by: INTERNAL MEDICINE

## 2025-09-02 PROCEDURE — C1760 CLOSURE DEV, VASC: HCPCS | Performed by: INTERNAL MEDICINE

## 2025-09-02 PROCEDURE — C9600 PERC DRUG-EL COR STENT SING: HCPCS | Mod: RC | Performed by: INTERNAL MEDICINE

## 2025-09-02 PROCEDURE — 2780000003 HC OR 278 NO HCPCS: Performed by: INTERNAL MEDICINE

## 2025-09-02 PROCEDURE — 92978 ENDOLUMINL IVUS OCT C 1ST: CPT | Mod: RC | Performed by: INTERNAL MEDICINE

## 2025-09-02 PROCEDURE — 2500000004 HC RX 250 GENERAL PHARMACY W/ HCPCS (ALT 636 FOR OP/ED): Performed by: INTERNAL MEDICINE

## 2025-09-02 PROCEDURE — 85347 COAGULATION TIME ACTIVATED: CPT

## 2025-09-02 PROCEDURE — 99152 MOD SED SAME PHYS/QHP 5/>YRS: CPT | Performed by: INTERNAL MEDICINE

## 2025-09-02 PROCEDURE — 92928 PRQ TCAT PLMT NTRAC ST 1 LES: CPT | Performed by: INTERNAL MEDICINE

## 2025-09-02 PROCEDURE — 2720000007 HC OR 272 NO HCPCS: Performed by: INTERNAL MEDICINE

## 2025-09-02 PROCEDURE — 2550000001 HC RX 255 CONTRASTS: Mod: JW | Performed by: INTERNAL MEDICINE

## 2025-09-02 PROCEDURE — 93005 ELECTROCARDIOGRAM TRACING: CPT | Mod: 59

## 2025-09-02 PROCEDURE — 7100000010 HC PHASE TWO TIME - EACH INCREMENTAL 1 MINUTE: Performed by: INTERNAL MEDICINE

## 2025-09-02 PROCEDURE — 93458 L HRT ARTERY/VENTRICLE ANGIO: CPT | Performed by: INTERNAL MEDICINE

## 2025-09-02 PROCEDURE — 96373 THER/PROPH/DIAG INJ IA: CPT | Performed by: INTERNAL MEDICINE

## 2025-09-02 PROCEDURE — 7100000009 HC PHASE TWO TIME - INITIAL BASE CHARGE: Performed by: INTERNAL MEDICINE

## 2025-09-02 PROCEDURE — C1725 CATH, TRANSLUMIN NON-LASER: HCPCS | Performed by: INTERNAL MEDICINE

## 2025-09-02 PROCEDURE — 92978 ENDOLUMINL IVUS OCT C 1ST: CPT | Performed by: INTERNAL MEDICINE

## 2025-09-02 PROCEDURE — 99153 MOD SED SAME PHYS/QHP EA: CPT | Performed by: INTERNAL MEDICINE

## 2025-09-02 PROCEDURE — C1887 CATHETER, GUIDING: HCPCS | Performed by: INTERNAL MEDICINE

## 2025-09-02 PROCEDURE — RXMED WILLOW AMBULATORY MEDICATION CHARGE

## 2025-09-02 PROCEDURE — 2500000004 HC RX 250 GENERAL PHARMACY W/ HCPCS (ALT 636 FOR OP/ED): Performed by: NURSE PRACTITIONER

## 2025-09-02 PROCEDURE — C1894 INTRO/SHEATH, NON-LASER: HCPCS | Performed by: INTERNAL MEDICINE

## 2025-09-02 PROCEDURE — 93010 ELECTROCARDIOGRAM REPORT: CPT | Performed by: INTERNAL MEDICINE

## 2025-09-02 PROCEDURE — C1874 STENT, COATED/COV W/DEL SYS: HCPCS | Performed by: INTERNAL MEDICINE

## 2025-09-02 PROCEDURE — C1753 CATH, INTRAVAS ULTRASOUND: HCPCS | Performed by: INTERNAL MEDICINE

## 2025-09-02 PROCEDURE — 2500000001 HC RX 250 WO HCPCS SELF ADMINISTERED DRUGS (ALT 637 FOR MEDICARE OP): Performed by: INTERNAL MEDICINE

## 2025-09-02 DEVICE — IMPLANTABLE DEVICE: Type: IMPLANTABLE DEVICE | Site: HEART | Status: FUNCTIONAL

## 2025-09-02 RX ORDER — FENTANYL CITRATE 50 UG/ML
INJECTION, SOLUTION INTRAMUSCULAR; INTRAVENOUS AS NEEDED
Status: DISCONTINUED | OUTPATIENT
Start: 2025-09-02 | End: 2025-09-02 | Stop reason: HOSPADM

## 2025-09-02 RX ORDER — ACETAMINOPHEN 325 MG/1
650 TABLET ORAL EVERY 6 HOURS PRN
Status: DISCONTINUED | OUTPATIENT
Start: 2025-09-02 | End: 2025-09-02 | Stop reason: HOSPADM

## 2025-09-02 RX ORDER — LIDOCAINE HYDROCHLORIDE 20 MG/ML
INJECTION, SOLUTION INFILTRATION; PERINEURAL AS NEEDED
Status: DISCONTINUED | OUTPATIENT
Start: 2025-09-02 | End: 2025-09-02 | Stop reason: HOSPADM

## 2025-09-02 RX ORDER — SODIUM CHLORIDE 9 MG/ML
100 INJECTION, SOLUTION INTRAVENOUS CONTINUOUS
Status: DISCONTINUED | OUTPATIENT
Start: 2025-09-02 | End: 2025-09-02

## 2025-09-02 RX ORDER — MIDAZOLAM HYDROCHLORIDE 1 MG/ML
INJECTION, SOLUTION INTRAMUSCULAR; INTRAVENOUS AS NEEDED
Status: DISCONTINUED | OUTPATIENT
Start: 2025-09-02 | End: 2025-09-02 | Stop reason: HOSPADM

## 2025-09-02 RX ORDER — NAPROXEN SODIUM 220 MG/1
81 TABLET, FILM COATED ORAL DAILY
Qty: 30 TABLET | Refills: 0 | Status: SHIPPED | OUTPATIENT
Start: 2025-09-02 | End: 2025-10-02

## 2025-09-02 RX ORDER — SODIUM CHLORIDE 9 MG/ML
100 INJECTION, SOLUTION INTRAVENOUS CONTINUOUS
Status: ACTIVE | OUTPATIENT
Start: 2025-09-02 | End: 2025-09-02

## 2025-09-02 RX ORDER — HEPARIN SODIUM 1000 [USP'U]/ML
INJECTION, SOLUTION INTRAVENOUS; SUBCUTANEOUS AS NEEDED
Status: DISCONTINUED | OUTPATIENT
Start: 2025-09-02 | End: 2025-09-02 | Stop reason: HOSPADM

## 2025-09-02 RX ORDER — MORPHINE SULFATE 2 MG/ML
2 INJECTION, SOLUTION INTRAMUSCULAR; INTRAVENOUS EVERY 6 HOURS PRN
Status: DISCONTINUED | OUTPATIENT
Start: 2025-09-02 | End: 2025-09-02 | Stop reason: HOSPADM

## 2025-09-02 RX ORDER — CLOPIDOGREL BISULFATE 300 MG/1
TABLET, FILM COATED ORAL AS NEEDED
Status: DISCONTINUED | OUTPATIENT
Start: 2025-09-02 | End: 2025-09-02 | Stop reason: HOSPADM

## 2025-09-02 RX ORDER — ASPIRIN 325 MG
TABLET ORAL AS NEEDED
Status: DISCONTINUED | OUTPATIENT
Start: 2025-09-02 | End: 2025-09-02 | Stop reason: HOSPADM

## 2025-09-02 RX ORDER — ACETAMINOPHEN 160 MG/5ML
650 SOLUTION ORAL EVERY 6 HOURS PRN
Status: DISCONTINUED | OUTPATIENT
Start: 2025-09-02 | End: 2025-09-02 | Stop reason: HOSPADM

## 2025-09-02 RX ORDER — SODIUM CHLORIDE 9 MG/ML
1000 INJECTION, SOLUTION INTRAVENOUS ONCE AS NEEDED
Status: DISCONTINUED | OUTPATIENT
Start: 2025-09-02 | End: 2025-09-02 | Stop reason: HOSPADM

## 2025-09-02 RX ORDER — NITROGLYCERIN 20 MG/100ML
1-200 INJECTION INTRAVENOUS CONTINUOUS
Status: DISCONTINUED | OUTPATIENT
Start: 2025-09-02 | End: 2025-09-02 | Stop reason: HOSPADM

## 2025-09-02 RX ORDER — ACETAMINOPHEN 650 MG/1
650 SUPPOSITORY RECTAL EVERY 6 HOURS PRN
Status: DISCONTINUED | OUTPATIENT
Start: 2025-09-02 | End: 2025-09-02 | Stop reason: HOSPADM

## 2025-09-02 RX ADMIN — SODIUM CHLORIDE 100 ML/HR: 9 INJECTION, SOLUTION INTRAVENOUS at 09:54

## 2025-09-02 ASSESSMENT — PAIN SCALES - GENERAL

## 2025-09-02 ASSESSMENT — PAIN - FUNCTIONAL ASSESSMENT

## 2025-12-29 ENCOUNTER — APPOINTMENT (OUTPATIENT)
Dept: PRIMARY CARE | Facility: CLINIC | Age: 70
End: 2025-12-29
Payer: COMMERCIAL

## (undated) DEVICE — CATHETER, OPTITORQUE, 6FR, JACKY, BL3.5/2H/100CM

## (undated) DEVICE — SHEATH, GLIDESHEATH, SLENDER, 6FR 10CM

## (undated) DEVICE — DEVICE, INFLATION, PRESTO ID40ATM 30CC

## (undated) DEVICE — TR BAND, RADIAL COMPRESSION, STANDARD, 24CM

## (undated) DEVICE — GUIDEWIRE, INQUIRE, J TIP, .035 X 210CM, FIXED CORE, DIAGNOSTIC

## (undated) DEVICE — Device

## (undated) DEVICE — CATHETER, BALLOON DILATION, EUPHORA SEMICOMPLIANT 2.50  X 15 MM X 142CM

## (undated) DEVICE — CATHETER, DIAGNOSTIC, DXTERITY, 6FR 100CM, JL35

## (undated) DEVICE — CATHETER, EAGLE EYE, PLATNIUM (IVUS)

## (undated) DEVICE — CATHETER, GUIDING, LAUNCHER, 6FR, JR 4.0

## (undated) DEVICE — GUIDEWIRE, UNIVERSAL BALANCE MID WEIGHT